# Patient Record
Sex: FEMALE | Race: WHITE | NOT HISPANIC OR LATINO | URBAN - METROPOLITAN AREA
[De-identification: names, ages, dates, MRNs, and addresses within clinical notes are randomized per-mention and may not be internally consistent; named-entity substitution may affect disease eponyms.]

---

## 2019-01-17 ENCOUNTER — OUTPATIENT (OUTPATIENT)
Dept: OUTPATIENT SERVICES | Facility: HOSPITAL | Age: 84
LOS: 1 days | Discharge: HOME | End: 2019-01-17
Payer: MEDICARE

## 2019-01-17 DIAGNOSIS — Z98.89 OTHER SPECIFIED POSTPROCEDURAL STATES: Chronic | ICD-10-CM

## 2019-01-17 DIAGNOSIS — Z90.49 ACQUIRED ABSENCE OF OTHER SPECIFIED PARTS OF DIGESTIVE TRACT: Chronic | ICD-10-CM

## 2019-01-17 PROCEDURE — C1764: CPT

## 2019-01-17 PROCEDURE — 33285 INSJ SUBQ CAR RHYTHM MNTR: CPT

## 2019-01-17 NOTE — PROGRESS NOTE ADULT - SUBJECTIVE AND OBJECTIVE BOX
Cardiac Electrophysiology Admission Note    History of Present Illness: 83    Past Medical History:    Past Surgical History:    Family History: Non-contributory  Social History: denies smoking, drugs.  Social ETOH (0-5 drinks a week)  Allergies: shellfish, eggs, soy, gluten, dairy, latex  Medications: Reglan PRN and Zyrtec PRN  Physical:   HR: 95		BP: 125/78		O2 Sat 100%  	Temp: afebrile  GEN: NAD  HEENT: no JVD  CARDS: S1S2 RRR  LUNGS: CTAB no w/r/r  EXT: no edema  NEURO: no deficit noted    EKG: Normal sinus rhythm.    A/P: Cardiac Electrophysiology Admission Note    History of Present Illness: 83 y.o.    Past Medical History:    Past Surgical History:    Family History: Non-contributory  Social History: denies smoking, drugs.  Social ETOH (0-5 drinks a week)  Allergies: shellfish, eggs, soy, gluten, dairy, latex  Medications: Reglan PRN and Zyrtec PRN  Physical:   HR: 95		BP: 125/78		O2 Sat 100%  	Temp: afebrile  GEN: NAD  HEENT: no JVD  CARDS: S1S2 RRR  LUNGS: CTAB no w/r/r  EXT: no edema  NEURO: no deficit noted    EKG: Normal sinus rhythm.    A/P: Cardiac Electrophysiology Admission Note    History of Present Illness: 83 y.o. with pmhx significant for Afib, on Eliquis, polymyalgia rheumatica, L BCA, and SCC of tongue, who presents for an ILR implant. Pt feels well. Denies palpitations, lightheadedness, c/p, sob and recent syncope.       Past Medical History:    see above    Past Surgical History:    see above    Family History: Non-contributory    Social History: Non-smoker    Allergies: PCN    Medications:  Eliquis 5 mg BID  Toprol 50 mg QD  Asa 81 mg QD  Crestor 10 mg QD  Prednisone 5 mg QD    Physical:   HR: 95		BP: 125/78		O2 Sat 100%  	Temp: afebrile    GEN: NAD  HEENT: no JVD  CARDS: S1S2 RRR  LUNGS: CTAB no w/r/r  EXT: no edema  NEURO: no deficit noted    EKG: Normal sinus rhythm.    A/P:    - Plan for ILR implant. Procedure explained, along with risks, including infection and bleeding. Pt amenable. Consent signed.   - D/c immediately after implant.   - F/u with Dr. Haynes in one month.

## 2019-01-27 DIAGNOSIS — I48.91 UNSPECIFIED ATRIAL FIBRILLATION: ICD-10-CM

## 2019-01-27 DIAGNOSIS — Z88.0 ALLERGY STATUS TO PENICILLIN: ICD-10-CM

## 2019-01-27 DIAGNOSIS — Z79.82 LONG TERM (CURRENT) USE OF ASPIRIN: ICD-10-CM

## 2019-01-27 DIAGNOSIS — R55 SYNCOPE AND COLLAPSE: ICD-10-CM

## 2019-01-27 DIAGNOSIS — M35.3 POLYMYALGIA RHEUMATICA: ICD-10-CM

## 2019-01-27 DIAGNOSIS — Z79.01 LONG TERM (CURRENT) USE OF ANTICOAGULANTS: ICD-10-CM

## 2019-01-27 DIAGNOSIS — Z79.52 LONG TERM (CURRENT) USE OF SYSTEMIC STEROIDS: ICD-10-CM

## 2022-01-27 ENCOUNTER — OUTPATIENT (OUTPATIENT)
Dept: OUTPATIENT SERVICES | Facility: HOSPITAL | Age: 87
LOS: 1 days | Discharge: HOME | End: 2022-01-27
Payer: MEDICARE

## 2022-01-27 DIAGNOSIS — Z98.89 OTHER SPECIFIED POSTPROCEDURAL STATES: Chronic | ICD-10-CM

## 2022-01-27 DIAGNOSIS — Z90.49 ACQUIRED ABSENCE OF OTHER SPECIFIED PARTS OF DIGESTIVE TRACT: Chronic | ICD-10-CM

## 2022-01-27 PROCEDURE — 84132 ASSAY OF SERUM POTASSIUM: CPT

## 2022-01-27 PROCEDURE — 85610 PROTHROMBIN TIME: CPT

## 2022-01-27 PROCEDURE — 82565 ASSAY OF CREATININE: CPT

## 2022-01-27 NOTE — PROGRESS NOTE ADULT - SUBJECTIVE AND OBJECTIVE BOX
Cardiac Electrophysiology Admission Note    History of Present Illness:  86 year old lewis family with HLD, TIA, PMR and atrial fibrillation, who presents for initial evaluation.    She states she has been on a NOAC for the past 4 years - she notes compliance with no missed doses.  She states for the past 6 and a half months she has had increased SOB after about 1/2 a block.  She has palpitations as well.  She has had 2 syncopal episodes over the past year which she has been worked up at [facility] and has followed up with her cardiologist - she is unaware of a diagnosis.  No chest pain, SOB at rest, orthopnea, PND or edema.  She presents for an elective cardioversion.     Past Medical History:  as above  Past Surgical History:  carpel tunnel, breast lumpectomy  Family History: Non-contributory  Social History: denies smoking, drugs, ETOH  Allergies: PCN  Medications:    Physical:   HR: 105	BP: 125/80		O2 Sat 100%  	Temp: afebrile  GEN: NAD  HEENT: no JVD  CARDS: regularly irregular  LUNGS: CTAB no w/r/r  EXT: no edema  NEURO: no deficit noted    EKG:AT    A/P:  86 year old lewis family with HLD, TIA, PMR and atrial fibrillation, who presents for initial evaluation.  NPO and consented for cardioversion

## 2022-01-28 LAB
ISTAT INR: 1.8 — HIGH (ref 0.88–1.16)
ISTAT PT: 21.2 SEC — HIGH (ref 10–12.9)
ISTAT VENOUS BE: 3 MMOL/L — SIGNIFICANT CHANGE UP (ref -2–3)
ISTAT VENOUS GLUCOSE: 88 MG/DL — SIGNIFICANT CHANGE UP (ref 70–99)
ISTAT VENOUS HCO3: 28 MMOL/L — SIGNIFICANT CHANGE UP (ref 23–28)
ISTAT VENOUS HEMATOCRIT: 33 % — LOW (ref 34.5–45)
ISTAT VENOUS HEMOGLOBIN: 11.2 GM/DL — LOW (ref 11.5–15.5)
ISTAT VENOUS IONIZED CALCIUM: 1.23 MMOL/L — SIGNIFICANT CHANGE UP (ref 1.12–1.3)
ISTAT VENOUS PCO2: 45 MMHG — SIGNIFICANT CHANGE UP (ref 41–51)
ISTAT VENOUS PH: 7.41 — SIGNIFICANT CHANGE UP (ref 7.31–7.41)
ISTAT VENOUS PO2: <66 MMHG — LOW (ref 35–40)
ISTAT VENOUS POTASSIUM: 3.4 MMOL/L — LOW (ref 3.5–5.3)
ISTAT VENOUS SODIUM: 142 MMOL/L — SIGNIFICANT CHANGE UP (ref 135–145)
ISTAT VENOUS TCO2: 30 MMOL/L — SIGNIFICANT CHANGE UP (ref 22–31)
POCT ISTAT CREATININE: 1.3 MG/DL — SIGNIFICANT CHANGE UP (ref 0.5–1.3)

## 2022-03-10 DIAGNOSIS — I48.0 PAROXYSMAL ATRIAL FIBRILLATION: ICD-10-CM

## 2024-04-27 ENCOUNTER — NON-APPOINTMENT (OUTPATIENT)
Age: 89
End: 2024-04-27

## 2025-01-16 ENCOUNTER — INPATIENT (INPATIENT)
Facility: HOSPITAL | Age: 89
LOS: 4 days | Discharge: HOME HEALTH | End: 2025-01-21
Attending: SPECIALIST | Admitting: SPECIALIST
Payer: MEDICARE

## 2025-01-16 VITALS
OXYGEN SATURATION: 99 % | WEIGHT: 108.91 LBS | SYSTOLIC BLOOD PRESSURE: 118 MMHG | RESPIRATION RATE: 18 BRPM | DIASTOLIC BLOOD PRESSURE: 75 MMHG | HEART RATE: 105 BPM | TEMPERATURE: 98 F
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
## 2025-01-16 DIAGNOSIS — E03.9 HYPOTHYROIDISM, UNSPECIFIED: ICD-10-CM

## 2025-01-16 DIAGNOSIS — Z85.3 PERSONAL HISTORY OF MALIGNANT NEOPLASM OF BREAST: ICD-10-CM

## 2025-01-16 DIAGNOSIS — I10 ESSENTIAL (PRIMARY) HYPERTENSION: ICD-10-CM

## 2025-01-16 DIAGNOSIS — I45.10 UNSPECIFIED RIGHT BUNDLE-BRANCH BLOCK: ICD-10-CM

## 2025-01-16 DIAGNOSIS — A41.89 OTHER SPECIFIED SEPSIS: ICD-10-CM

## 2025-01-16 DIAGNOSIS — Z88.0 ALLERGY STATUS TO PENICILLIN: ICD-10-CM

## 2025-01-16 DIAGNOSIS — N28.9 DISORDER OF KIDNEY AND URETER, UNSPECIFIED: ICD-10-CM

## 2025-01-16 DIAGNOSIS — I95.9 HYPOTENSION, UNSPECIFIED: ICD-10-CM

## 2025-01-16 DIAGNOSIS — M35.3 POLYMYALGIA RHEUMATICA: ICD-10-CM

## 2025-01-16 DIAGNOSIS — I24.89 OTHER FORMS OF ACUTE ISCHEMIC HEART DISEASE: ICD-10-CM

## 2025-01-16 DIAGNOSIS — D64.9 ANEMIA, UNSPECIFIED: ICD-10-CM

## 2025-01-16 DIAGNOSIS — I50.32 CHRONIC DIASTOLIC (CONGESTIVE) HEART FAILURE: ICD-10-CM

## 2025-01-16 DIAGNOSIS — E86.0 DEHYDRATION: ICD-10-CM

## 2025-01-16 DIAGNOSIS — Z90.49 ACQUIRED ABSENCE OF OTHER SPECIFIED PARTS OF DIGESTIVE TRACT: Chronic | ICD-10-CM

## 2025-01-16 DIAGNOSIS — I48.20 CHRONIC ATRIAL FIBRILLATION, UNSPECIFIED: ICD-10-CM

## 2025-01-16 DIAGNOSIS — C50.919 MALIGNANT NEOPLASM OF UNSPECIFIED SITE OF UNSPECIFIED FEMALE BREAST: ICD-10-CM

## 2025-01-16 DIAGNOSIS — U07.1 COVID-19: ICD-10-CM

## 2025-01-16 DIAGNOSIS — Z79.01 LONG TERM (CURRENT) USE OF ANTICOAGULANTS: ICD-10-CM

## 2025-01-16 DIAGNOSIS — Z79.890 HORMONE REPLACEMENT THERAPY: ICD-10-CM
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
## 2025-01-16 DIAGNOSIS — Z79.82 LONG TERM (CURRENT) USE OF ASPIRIN: ICD-10-CM

## 2025-01-16 DIAGNOSIS — S00.31XA ABRASION OF NOSE, INITIAL ENCOUNTER: ICD-10-CM

## 2025-01-16 DIAGNOSIS — J12.82 PNEUMONIA DUE TO CORONAVIRUS DISEASE 2019: ICD-10-CM

## 2025-01-16 DIAGNOSIS — Z86.73 PERSONAL HISTORY OF TRANSIENT ISCHEMIC ATTACK (TIA), AND CEREBRAL INFARCTION WITHOUT RESIDUAL DEFICITS: ICD-10-CM

## 2025-01-16 DIAGNOSIS — I11.0 HYPERTENSIVE HEART DISEASE WITH HEART FAILURE: ICD-10-CM

## 2025-01-16 DIAGNOSIS — Z85.810 PERSONAL HISTORY OF MALIGNANT NEOPLASM OF TONGUE: ICD-10-CM

## 2025-01-16 DIAGNOSIS — R79.89 OTHER SPECIFIED ABNORMAL FINDINGS OF BLOOD CHEMISTRY: ICD-10-CM

## 2025-01-16 DIAGNOSIS — Z98.89 OTHER SPECIFIED POSTPROCEDURAL STATES: Chronic | ICD-10-CM
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
## 2025-01-16 DIAGNOSIS — Z29.9 ENCOUNTER FOR PROPHYLACTIC MEASURES, UNSPECIFIED: ICD-10-CM

## 2025-01-16 LAB
ADD ON TEST-SPECIMEN IN LAB: SIGNIFICANT CHANGE UP
ALBUMIN SERPL ELPH-MCNC: 3.9 G/DL — SIGNIFICANT CHANGE UP (ref 3.3–5)
ALP SERPL-CCNC: 50 U/L — SIGNIFICANT CHANGE UP (ref 40–120)
ALT FLD-CCNC: 13 U/L — SIGNIFICANT CHANGE UP (ref 10–45)
ANION GAP SERPL CALC-SCNC: 17 MMOL/L — SIGNIFICANT CHANGE UP (ref 5–17)
APPEARANCE UR: CLEAR — SIGNIFICANT CHANGE UP
APTT BLD: 33.9 SEC — SIGNIFICANT CHANGE UP (ref 24.5–35.6)
AST SERPL-CCNC: 33 U/L — SIGNIFICANT CHANGE UP (ref 10–40)
BASOPHILS # BLD AUTO: 0.04 K/UL — SIGNIFICANT CHANGE UP (ref 0–0.2)
BASOPHILS NFR BLD AUTO: 0.3 % — SIGNIFICANT CHANGE UP (ref 0–2)
BILIRUB SERPL-MCNC: 0.5 MG/DL — SIGNIFICANT CHANGE UP (ref 0.2–1.2)
BILIRUB UR-MCNC: NEGATIVE — SIGNIFICANT CHANGE UP
BUN SERPL-MCNC: 22 MG/DL — SIGNIFICANT CHANGE UP (ref 7–23)
CALCIUM SERPL-MCNC: 9.2 MG/DL — SIGNIFICANT CHANGE UP (ref 8.4–10.5)
CHLORIDE SERPL-SCNC: 96 MMOL/L — SIGNIFICANT CHANGE UP (ref 96–108)
CO2 SERPL-SCNC: 25 MMOL/L — SIGNIFICANT CHANGE UP (ref 22–31)
COLOR SPEC: YELLOW — SIGNIFICANT CHANGE UP
CREAT SERPL-MCNC: 1.25 MG/DL — SIGNIFICANT CHANGE UP (ref 0.5–1.3)
DIFF PNL FLD: ABNORMAL
EGFR: 41 ML/MIN/1.73M2 — LOW
EOSINOPHIL # BLD AUTO: 0.01 K/UL — SIGNIFICANT CHANGE UP (ref 0–0.5)
EOSINOPHIL NFR BLD AUTO: 0.1 % — SIGNIFICANT CHANGE UP (ref 0–6)
FLUAV AG NPH QL: SIGNIFICANT CHANGE UP
FLUBV AG NPH QL: SIGNIFICANT CHANGE UP
GAS PNL BLDV: SIGNIFICANT CHANGE UP
GLUCOSE SERPL-MCNC: 133 MG/DL — HIGH (ref 70–99)
GLUCOSE UR QL: NEGATIVE MG/DL — SIGNIFICANT CHANGE UP
HCT VFR BLD CALC: 30.9 % — LOW (ref 34.5–45)
HGB BLD-MCNC: 10.1 G/DL — LOW (ref 11.5–15.5)
IMM GRANULOCYTES NFR BLD AUTO: 0.8 % — SIGNIFICANT CHANGE UP (ref 0–0.9)
INR BLD: 1.27 — HIGH (ref 0.85–1.16)
KETONES UR-MCNC: NEGATIVE MG/DL — SIGNIFICANT CHANGE UP
LACTATE SERPL-SCNC: 1.8 MMOL/L — SIGNIFICANT CHANGE UP (ref 0.5–2)
LACTATE SERPL-SCNC: 2.1 MMOL/L — HIGH (ref 0.5–2)
LEUKOCYTE ESTERASE UR-ACNC: NEGATIVE — SIGNIFICANT CHANGE UP
LYMPHOCYTES # BLD AUTO: 1.18 K/UL — SIGNIFICANT CHANGE UP (ref 1–3.3)
LYMPHOCYTES # BLD AUTO: 10.2 % — LOW (ref 13–44)
MCHC RBC-ENTMCNC: 29.9 PG — SIGNIFICANT CHANGE UP (ref 27–34)
MCHC RBC-ENTMCNC: 32.7 G/DL — SIGNIFICANT CHANGE UP (ref 32–36)
MCV RBC AUTO: 91.4 FL — SIGNIFICANT CHANGE UP (ref 80–100)
MONOCYTES # BLD AUTO: 1.28 K/UL — HIGH (ref 0–0.9)
MONOCYTES NFR BLD AUTO: 11.1 % — SIGNIFICANT CHANGE UP (ref 2–14)
NEUTROPHILS # BLD AUTO: 8.96 K/UL — HIGH (ref 1.8–7.4)
NEUTROPHILS NFR BLD AUTO: 77.5 % — HIGH (ref 43–77)
NITRITE UR-MCNC: NEGATIVE — SIGNIFICANT CHANGE UP
NRBC # BLD: 0 /100 WBCS — SIGNIFICANT CHANGE UP (ref 0–0)
NRBC BLD-RTO: 0 /100 WBCS — SIGNIFICANT CHANGE UP (ref 0–0)
PH UR: 5.5 — SIGNIFICANT CHANGE UP (ref 5–8)
PLATELET # BLD AUTO: 191 K/UL — SIGNIFICANT CHANGE UP (ref 150–400)
POTASSIUM SERPL-MCNC: 4.1 MMOL/L — SIGNIFICANT CHANGE UP (ref 3.5–5.3)
POTASSIUM SERPL-SCNC: 4.1 MMOL/L — SIGNIFICANT CHANGE UP (ref 3.5–5.3)
PROT SERPL-MCNC: 6.9 G/DL — SIGNIFICANT CHANGE UP (ref 6–8.3)
PROT UR-MCNC: 30 MG/DL
PROTHROM AB SERPL-ACNC: 14.6 SEC — HIGH (ref 9.9–13.4)
RBC # BLD: 3.38 M/UL — LOW (ref 3.8–5.2)
RBC # FLD: 14.6 % — HIGH (ref 10.3–14.5)
RSV RNA NPH QL NAA+NON-PROBE: SIGNIFICANT CHANGE UP
SARS-COV-2 RNA SPEC QL NAA+PROBE: DETECTED
SODIUM SERPL-SCNC: 138 MMOL/L — SIGNIFICANT CHANGE UP (ref 135–145)
SP GR SPEC: >1.03 — HIGH (ref 1–1.03)
TROPONIN T, HIGH SENSITIVITY RESULT: 307 NG/L — CRITICAL HIGH (ref 0–51)
TROPONIN T, HIGH SENSITIVITY RESULT: 373 NG/L — CRITICAL HIGH (ref 0–51)
UROBILINOGEN FLD QL: 0.2 MG/DL — SIGNIFICANT CHANGE UP (ref 0.2–1)
WBC # BLD: 11.56 K/UL — HIGH (ref 3.8–10.5)
WBC # FLD AUTO: 11.56 K/UL — HIGH (ref 3.8–10.5)

## 2025-01-16 PROCEDURE — 70450 CT HEAD/BRAIN W/O DYE: CPT | Mod: 26

## 2025-01-16 PROCEDURE — 71045 X-RAY EXAM CHEST 1 VIEW: CPT | Mod: 26

## 2025-01-16 PROCEDURE — 99291 CRITICAL CARE FIRST HOUR: CPT

## 2025-01-16 PROCEDURE — 93010 ELECTROCARDIOGRAM REPORT: CPT

## 2025-01-16 PROCEDURE — 71250 CT THORAX DX C-: CPT | Mod: 26

## 2025-01-16 PROCEDURE — 70486 CT MAXILLOFACIAL W/O DYE: CPT | Mod: 26

## 2025-01-16 PROCEDURE — 74177 CT ABD & PELVIS W/CONTRAST: CPT | Mod: 26

## 2025-01-16 RX ORDER — RITONAVIR 100 MG/1
100 TABLET ORAL EVERY 12 HOURS
Refills: 0 | Status: DISCONTINUED | OUTPATIENT
Start: 2025-01-16 | End: 2025-01-16

## 2025-01-16 RX ORDER — SODIUM CHLORIDE 9 G/ML
1550 INJECTION, SOLUTION INTRAVENOUS ONCE
Refills: 0 | Status: DISCONTINUED | OUTPATIENT
Start: 2025-01-16 | End: 2025-01-16

## 2025-01-16 RX ORDER — LEVOTHYROXINE SODIUM 25 UG/1
25 TABLET ORAL DAILY
Refills: 0 | Status: DISCONTINUED | OUTPATIENT
Start: 2025-01-16 | End: 2025-01-21

## 2025-01-16 RX ORDER — REMDESIVIR 100 MG/1
INJECTION, POWDER, LYOPHILIZED, FOR SOLUTION INTRAVENOUS
Refills: 0 | Status: DISCONTINUED | OUTPATIENT
Start: 2025-01-16 | End: 2025-01-16

## 2025-01-16 RX ORDER — ACETAMINOPHEN, DIPHENHYDRAMINE HCL, PHENYLEPHRINE HCL 325; 25; 5 MG/1; MG/1; MG/1
3 TABLET ORAL AT BEDTIME
Refills: 0 | Status: DISCONTINUED | OUTPATIENT
Start: 2025-01-16 | End: 2025-01-21

## 2025-01-16 RX ORDER — REMDESIVIR 100 MG/1
200 INJECTION, POWDER, LYOPHILIZED, FOR SOLUTION INTRAVENOUS EVERY 24 HOURS
Refills: 0 | Status: COMPLETED | OUTPATIENT
Start: 2025-01-16 | End: 2025-01-17

## 2025-01-16 RX ORDER — METOPROLOL SUCCINATE 25 MG
100 TABLET, EXTENDED RELEASE 24 HR ORAL EVERY 24 HOURS
Refills: 0 | Status: DISCONTINUED | OUTPATIENT
Start: 2025-01-17 | End: 2025-01-17

## 2025-01-16 RX ORDER — ACETAMINOPHEN 160 MG/5ML
650 SUSPENSION ORAL EVERY 6 HOURS
Refills: 0 | Status: DISCONTINUED | OUTPATIENT
Start: 2025-01-16 | End: 2025-01-21

## 2025-01-16 RX ORDER — ONDANSETRON 4 MG/1
4 TABLET, ORALLY DISINTEGRATING ORAL EVERY 8 HOURS
Refills: 0 | Status: DISCONTINUED | OUTPATIENT
Start: 2025-01-16 | End: 2025-01-16

## 2025-01-16 RX ORDER — PREDNISONE 5 MG/1
5 TABLET ORAL DAILY
Refills: 0 | Status: DISCONTINUED | OUTPATIENT
Start: 2025-01-16 | End: 2025-01-21

## 2025-01-16 RX ORDER — MAGNESIUM, ALUMINUM HYDROXIDE 200-225/5
30 SUSPENSION, ORAL (FINAL DOSE FORM) ORAL EVERY 4 HOURS
Refills: 0 | Status: DISCONTINUED | OUTPATIENT
Start: 2025-01-16 | End: 2025-01-21

## 2025-01-16 RX ORDER — MAGNESIUM SULFATE 0.8 MEQ/ML
2 AMPUL (ML) INJECTION ONCE
Refills: 0 | Status: COMPLETED | OUTPATIENT
Start: 2025-01-16 | End: 2025-01-16

## 2025-01-16 RX ORDER — BACTERIOSTATIC SODIUM CHLORIDE 0.9 %
1000 VIAL (ML) INJECTION ONCE
Refills: 0 | Status: COMPLETED | OUTPATIENT
Start: 2025-01-16 | End: 2025-01-16

## 2025-01-16 RX ORDER — CLOSTRIDIUM TETANI TOXOID ANTIGEN (FORMALDEHYDE INACTIVATED), CORYNEBACTERIUM DIPHTHERIAE TOXOID ANTIGEN (FORMALDEHYDE INACTIVATED), BORDETELLA PERTUSSIS TOXOID ANTIGEN (GLUTARALDEHYDE INACTIVATED), BORDETELLA PERTUSSIS FILAMENTOUS HEMAGGLUTININ ANTIGEN (FORMALDEHYDE INACTIVATED), BORDETELLA PERTUSSIS PERTACTIN ANTIGEN, AND BORDETELLA PERTUSSIS FIMBRIAE 2/3 ANTIGEN 5; 2; 2.5; 5; 3; 5 [LF]/.5ML; [LF]/.5ML; UG/.5ML; UG/.5ML; UG/.5ML; UG/.5ML
0.5 INJECTION, SUSPENSION INTRAMUSCULAR ONCE
Refills: 0 | Status: COMPLETED | OUTPATIENT
Start: 2025-01-16 | End: 2025-01-16

## 2025-01-16 RX ORDER — BACTERIOSTATIC SODIUM CHLORIDE 0.9 %
1000 VIAL (ML) INJECTION
Refills: 0 | Status: DISCONTINUED | OUTPATIENT
Start: 2025-01-16 | End: 2025-01-16

## 2025-01-16 RX ORDER — BACTERIOSTATIC SODIUM CHLORIDE 0.9 %
1550 VIAL (ML) INJECTION ONCE
Refills: 0 | Status: COMPLETED | OUTPATIENT
Start: 2025-01-16 | End: 2025-01-16

## 2025-01-16 RX ORDER — REMDESIVIR 100 MG/1
INJECTION, POWDER, LYOPHILIZED, FOR SOLUTION INTRAVENOUS
Refills: 0 | Status: COMPLETED | OUTPATIENT
Start: 2025-01-16 | End: 2025-01-21

## 2025-01-16 RX ADMIN — Medication 1000 MILLILITER(S): at 18:30

## 2025-01-16 RX ADMIN — RITONAVIR 100 MILLIGRAM(S): 100 TABLET ORAL at 18:45

## 2025-01-16 RX ADMIN — Medication 1550 MILLILITER(S): at 15:23

## 2025-01-16 RX ADMIN — CLOSTRIDIUM TETANI TOXOID ANTIGEN (FORMALDEHYDE INACTIVATED), CORYNEBACTERIUM DIPHTHERIAE TOXOID ANTIGEN (FORMALDEHYDE INACTIVATED), BORDETELLA PERTUSSIS TOXOID ANTIGEN (GLUTARALDEHYDE INACTIVATED), BORDETELLA PERTUSSIS FILAMENTOUS HEMAGGLUTININ ANTIGEN (FORMALDEHYDE INACTIVATED), BORDETELLA PERTUSSIS PERTACTIN ANTIGEN, AND BORDETELLA PERTUSSIS FIMBRIAE 2/3 ANTIGEN 0.5 MILLILITER(S): 5; 2; 2.5; 5; 3; 5 INJECTION, SUSPENSION INTRAMUSCULAR at 15:22

## 2025-01-16 NOTE — ED ADULT TRIAGE NOTE - CHIEF COMPLAINT QUOTE
Pt presents to the ED for fall with positive head strike. Pt endorses "I don't know how I fell, I just fell." Per EMS pt was found to be hypotensive, tachycardic, on arrival. Pt given Tylenol prior to arrival for fever or uknown t max. Pt given 500cc NS by EMS prior to arrival. Pt on Xarelto.

## 2025-01-16 NOTE — H&P ADULT - HISTORY OF PRESENT ILLNESS
Patient is 89 y.o PMHx of significant for Afib, on Eliquis, polymyalgia rheumatica, L BCA, and SCC of tongue, who presents for unwitnessed fall. Per ED notes: Patient presented for unwitnessed fall with head stroke , she does not know how she fell, She was found hypotensive, tachycardic, with fever received 500cc ofNS and Tylenol for fever  As per daughter, pt had been coughing x few days, was noted to be incontinent of stool and felt warm. + n/v, nbnb emesis. After IV fluids: Pt  much improved, able to recall walking to bathroom, feeling weak and falling, hitting face on ground. Denies loc.     In the ED: FAST exam performed and neg for free fluid. Pt evaluated by cards who feels elev trop is due to demand ischemia from COVID/ hypotension; no indications for cards tele.     In the ED:   VS: T 98.5, T 105->97, BP 96/52->111/55, spo2 96% in RA, rr 18  Labs: WBC 11.56, Hgb 10.1, Plt 191, Trop 373->307, Lactate 2.1->1.8, Bicarb 25, AG 17, creatinine 1.25, COVID +,   Imaging:   CT HEAD: No acute abnormality. Chronic changes as above.Extensive periventricular hypoattenuation, compatible with advanced chronic microvascular ischemic changes. There is a small chronic lacunar infarct in the left corona radiata  CT FACIAL BONE: No acute abnormality  CT chest, a/p: No acute abnormality in the chest. A tiny hypodense focus peripherally in the spleen, uncertain if this represents a small cyst or a tiny laceration. No associated fluid or fatty stranding.  No acute rib fractures. An indeterminate cystic lesion in the right kidney for which further characterization with ultrasound is advised on outpatient basis.    ECG: HR 97, QTc 515, RBBB, (not seen in ECG from 2022) no significant ST wave changes  Intervnetion: Tdap, levofloxasin, Paxlovid, NS 2.5 L   Patient is 89 y.o PMHx of significant for Xarelto, on Eliquis, polymyalgia rheumatica, L BCA, and SCC of tongue (no hx of chemo), who presents for unwitnessed fall.  Patient presented for unwitnessed fall with head strike. Per daughter at bedside, mom was last seen by friends last night in usual state of health. This morning daughter called patient not answering. Other daughter then went home found patient in bed, appeared drowsy, was noted to be incontinent of stool  and called EMS. Upon arrival of EMS: She was found hypotensive, tachycardic, with fever received 500cc of NS and Tylenol for fever. As per daughter, pt had been coughing x few days. Upon arrivial to ED: after IV fluidspt much improved, able to recall walking to bathroom, feeling weak and falling, hitting face on ground. Denies loc.   In the ED: FAST exam performed and neg for free fluid. Pt evaluated by cards who feels elev trop, thought to be due to demand ischemia from COVID/ hypotension; no indications for cards tele.     In the ED:   VS: T 98.5, T 105->97, BP 96/52->111/55, spo2 96% in RA, rr 18  Labs: WBC 11.56, Hgb 10.1, Plt 191, Trop 373->307, Lactate 2.1->1.8, Bicarb 25, AG 17, creatinine 1.25, COVID +,   Imaging:   CT HEAD: No acute abnormality. Chronic changes as above. Extensive periventricular hypoattenuation, compatible with advanced chronic microvascular ischemic changes. There is a small chronic lacunar infarct in the left corona radiata  CT FACIAL BONE: No acute abnormality  CT chest, a/p: No acute abnormality in the chest. A tiny hypodense focus peripherally in the spleen, uncertain if this represents a small cyst or a tiny laceration. No associated fluid or fatty stranding.  No acute rib fractures. An indeterminate cystic lesion in the right kidney for which further characterization with ultrasound is advised on outpatient basis.    ECG: HR 97, QTc 515, RBBB, (not seen in ECG from 2022) no significant ST wave changes  Intervnetion: Tdap, levofloxasin, Paxlovid, NS 2.5 L   Patient is 89 y.o PMHx of significant for Afib on Xarelto, polymyalgia rheumatica, L BCA, and SCC of tongue (no hx of chemo), who presents for unwitnessed fall.  Patient presented for unwitnessed fall with head strike. Per daughter at bedside, mom was last seen by friends last night in usual state of health. This morning daughter called patient not answering. Other daughter then went home found patient in bed, appeared drowsy, was noted to be incontinent of stool  and called EMS. Upon arrival of EMS: She was found hypotensive, tachycardic, with fever received 500cc of NS and Tylenol for fever. As per daughter, pt had been coughing x few days. Upon arrivial to ED: after IV fluids pt much improved, able to recall walking to bathroom, feeling weak and falling, hitting face on ground. Denies loc.   In the ED: FAST exam performed and neg for free fluid. Pt evaluated by cards given elevated trop: thought to be due to demand ischemia from COVID/ hypotension; no indications for cards tele.     In the ED:   VS: T 98.5, T 105->97, BP 96/52->111/55, spo2 96% in RA, rr 18  Labs: WBC 11.56, Hgb 10.1, Plt 191, Trop 373->307, Lactate 2.1->1.8, Bicarb 25, AG 17, creatinine 1.25, COVID +,   Imaging:   CT HEAD: No acute abnormality. Chronic changes as above. Extensive periventricular hypoattenuation, compatible with advanced chronic microvascular ischemic changes. There is a small chronic lacunar infarct in the left corona radiata  CT FACIAL BONE: No acute abnormality  CT chest, a/p: No acute abnormality in the chest. A tiny hypodense focus peripherally in the spleen, uncertain if this represents a small cyst or a tiny laceration. No associated fluid or fatty stranding.  No acute rib fractures. An indeterminate cystic lesion in the right kidney for which further characterization with ultrasound is advised on outpatient basis.    ECG: HR 97, QTc 515, RBBB, (not seen in ECG from 2022) no significant ST wave changes  Intervnetion: Tdap, levofloxasin, Paxlovid, NS 2.5 L

## 2025-01-16 NOTE — H&P ADULT - PROBLEM SELECTOR PLAN 6
Home med per HIE: Metoprolol  qd and xarelto 15 qd    Plan:   -start metorpolol  qd  -Hold Xarelto for tonight (?spleen hematoma), if stable HGB, VS may consider restarting tmrw   -Med rec in AM

## 2025-01-16 NOTE — H&P ADULT - PROBLEM SELECTOR PLAN 3
Trop 373->307  ECG: HR 97, QTc 515, RBBB, (not seen in ECG from 2022) no significant ST wave changes    Plan:   -f/u cards recs (likely demand ischemia, no need for tele)  -repeat ECG in AM  -Avoid QT-c prolonging meds  -Maintain Mg>2, Ph>3

## 2025-01-16 NOTE — H&P ADULT - ASSESSMENT
Patient is 89 y.o PMHx of significant for Afib, on Eliquis, polymyalgia rheumatica, L BCA, and SCC of tongue, who presents for unwitnessed fall found to have COVID PNA, admitted for further work up of fall and treatment of COVID  Patient is 89 y.o PMHx of significant for Xarelto, on Eliquis, polymyalgia rheumatica, L BCA, and SCC of tongue (no hx of chemo), who presents for unwitnessed fall found to have hypotension initially, with COVID PNA, admitted for further work up of fall and treatment of COVID

## 2025-01-16 NOTE — H&P ADULT - PROBLEM SELECTOR PLAN 10
An indeterminate cystic lesion in the right kidney for which further characterization with ultrasound is advised on outpatient basis.    Plan:   -outpatinet f/u

## 2025-01-16 NOTE — H&P ADULT - PROBLEM SELECTOR PLAN 2
On admisison, Hgb 10.1. No recent baseline hgb 2016 8.2->8.8 COVID +  Reports cough for few days   CXR with vascular congestion otherwise clear lungs    Plan:   -c/w remdesivier for 5 days   -Monitor off abx  -f/u blood culture  -Collect sputum cltx, MRSA, urine strep/legionella (in case need for abx if signs of worsening infection/respiratory status worsen) COVID +  Reports cough for few days   CXR with vascular congestion otherwise clear lungs    Plan:   -c/w remdesivier for 5 days   -f/u blood culture  -Collect sputum cltx, MRSA, urine strep/legionella (in case need for abx if signs of worsening infection/respiratory status worsen)

## 2025-01-16 NOTE — ED PROVIDER NOTE - OBJECTIVE STATEMENT
Pt is an 86yo f, h/o SCC of tongue, breast ca, HTN, afib on xarelto, biba s/p fall. Pt does not recall what happened. As per daughter, pt had been coughing x few days, was noted to be incontinent of stool and felt warm. + n/v, nbnb emesis. Per EMS, pt was hypotensive and tachycardic, given NS 500cc bolus and tylenol pta. + mild left lower rib cage pain. No upper chest pain, palp, dizziness, sob, ha, neck/ back pain, n/t/w, acute vision change...+ abrasion to nose. Last td unknown.

## 2025-01-16 NOTE — H&P ADULT - PROBLEM SELECTOR PLAN 1
A tiny hypodense focus peripherally in the spleen, uncertain if this represents a small cyst or a tiny laceration. Presents with unwitnessed fall with head strike, NO LOC. Able to recall walking to bathroom feeling weak and falling. Able to walk back to bed by herself. Found with stool incontinence  No prior falls, no hx of seizure  On physical exam without focal deficit  CT head, maxillfacial, CT chest without acute findings. A tiny hypodense focus peripherally in the spleen, uncertain if this represents a small cyst or a tiny laceration.  ECG: HR 97, QTc 515, RBBB, (not seen in ECG from 2022) no significant ST wave changes  DDX: Likely orthostatic given hypotension vs low concern for seizure (as patient able to recall events) vs cardiogenic (loop recorder currently not working and no recent TTE available) vs vasovagal    Plan:   -check orthostatics in AM s/p fluids  -maintenance fluids overnight   -If change in MS, may consider vEEG  -Consider EP consult in AM to replace loop recorder  -TTE (f/u pro-BNP)  -PT consult  -fall precautions  -Monitor Hgb and VS closely given spleen finding ?cannot exclude hematoma

## 2025-01-16 NOTE — H&P ADULT - NSHPPHYSICALEXAM_GEN_ALL_CORE
.  VITAL SIGNS:  T(C): 37.5 (01-16-25 @ 19:28), Max: 37.5 (01-16-25 @ 19:28)  T(F): 99.5 (01-16-25 @ 19:28), Max: 99.5 (01-16-25 @ 19:28)  HR: 97 (01-16-25 @ 20:08) (94 - 109)  BP: 115/55 (01-16-25 @ 20:08) (96/52 - 118/75)  BP(mean): --  RR: 18 (01-16-25 @ 20:08) (18 - 18)  SpO2: 97% (01-16-25 @ 20:08) (95% - 99%)  Wt(kg): --    PHYSICAL EXAM:    Constitutional: Resting comfortably in bed; NAD  Head: NC/AT  Eyes: PERRL, EOMI, clear conjunctiva  ENT: no nasal discharge; uvula midline, no oropharyngeal erythema or exudates; MMM  Neck: supple; no JVD or thyromegaly  Respiratory: CTA B/L; no W/R/R, no retractions  Cardiac: +S1/S2; RRR; no M/R/G;  Gastrointestinal: soft, NT/ND; no rebound or guarding; +BSx4  Extremities: WWP, no clubbing or cyanosis; no peripheral edema  Musculoskeletal: NROM x4; no joint swelling, tenderness or erythema  Vascular: 2+ radial, femoral, DP/PT pulses B/L  Dermatologic: skin warm, dry and intact; no rashes, wounds, or scars  Neurologic: AAOx3; CNII-XII grossly intact; no focal deficits  Psychiatric: affect and characteristics of appearance, verbalizations, behaviors are appropriate .  VITAL SIGNS:  T(C): 37.5 (01-16-25 @ 19:28), Max: 37.5 (01-16-25 @ 19:28)  T(F): 99.5 (01-16-25 @ 19:28), Max: 99.5 (01-16-25 @ 19:28)  HR: 97 (01-16-25 @ 20:08) (94 - 109)  BP: 115/55 (01-16-25 @ 20:08) (96/52 - 118/75)  BP(mean): --  RR: 18 (01-16-25 @ 20:08) (18 - 18)  SpO2: 97% (01-16-25 @ 20:08) (95% - 99%)  Wt(kg): --    PHYSICAL EXAM:    Constitutional: Resting comfortably in bed; NAD  Head: NC/AT  Eyes: PERRL, EOMI, clear conjunctiva  ENT: no nasal discharge;; MMM  Neck: supple;   Respiratory: CTA B/L; no W/R/R, no retractions  Cardiac: +S1/S2; RRR; no M/R/G;  Gastrointestinal: soft, NT/ND; no rebound or guarding; +BSx4  Extremities: WWP, no clubbing or cyanosis; no peripheral edema  Musculoskeletal:; no joint swelling, tenderness or erythema  Vascular: 2+ radial, femoral, DP/PT pulses B/L  Dermatologic: skin warm, dry and intact; no rashes, wounds, or scars  Neurologic: AAOx2 (person and place, not time); strength 4/5 in UE and LE clau  Psychiatric: affect and characteristics of appearance, verbalizations, behaviors are appropriate

## 2025-01-16 NOTE — ED ADULT NURSE NOTE - OBJECTIVE STATEMENT
Pt is an 88 yo F bibems from home for fall. Pt unsure how she fell but thinks she was feeling lightheaded prior. Denies cp, sob, unilateral weakness, vision changes, speech changes.  Pt upgraded to MD Bunn. Pt hypotensive 90s/60s, given ~500cc NS by EMS. Pt in NAD, respirations even and unlabored. Speaking in full and complete sentences, a&ox3. Moving all extremities. 20g PIV to R forearm placed by EMS.

## 2025-01-16 NOTE — ED PROVIDER NOTE - WR ORDER NAME 1
Xray Chest 1 View-PORTABLE IMMEDIATE Closure 2 Information: This tab is for additional flaps and grafts, including complex repair and grafts and complex repair and flaps. You can also specify a different location for the additional defect, if the location is the same you do not need to select a new one. We will insert the automated text for the repair you select below just as we do for solitary flaps and grafts. Please note that at this time if you select a location with a different insurance zone you will need to override the ICD10 and CPT if appropriate.

## 2025-01-16 NOTE — H&P ADULT - NSICDXPASTMEDICALHX_GEN_ALL_CORE_FT
PAST MEDICAL HISTORY:  Carpal tunnel syndrome, unspecified laterality     Chronic atrial fibrillation     Essential hypertension     Hypothyroidism     Polymyalgia rheumatica     Squamous cell carcinoma of tongue right sided

## 2025-01-16 NOTE — H&P ADULT - NSICDXFAMILYHX_GEN_ALL_CORE_FT
FAMILY HISTORY:  Father  Still living? Unknown  Family history of essential hypertension, Age at diagnosis: Age Unknown    Mother  Still living? Unknown  Family history of essential hypertension, Age at diagnosis: Age Unknown

## 2025-01-16 NOTE — ED ADULT NURSE NOTE - NSFALLHARMRISKINTERV_ED_ALL_ED
Assistance OOB with selected safe patient handling equipment if applicable/Communicate risk of Fall with Harm to all staff, patient, and family/Provide visual cue: red socks, yellow wristband, yellow gown, etc/Reinforce activity limits and safety measures with patient and family/Bed in lowest position, wheels locked, appropriate side rails in place/Call bell, personal items and telephone in reach/Instruct patient to call for assistance before getting out of bed/chair/stretcher/Non-slip footwear applied when patient is off stretcher/[place] to call system/Physically safe environment - no spills, clutter or unnecessary equipment/Purposeful Proactive Rounding/Room/bathroom lighting operational, light cord in reach

## 2025-01-16 NOTE — ED PROVIDER NOTE - PROGRESS NOTE DETAILS
vs improved. pt stable. ct imaging neg for acute pathology. a/p w tiny focus in spleen. reviewed w Dr. Hernandez, unlikely traumatic/ bleed. trop and lactate downtrending. discussed w daughter/ pt. admit to floor. - Dr. Freeman

## 2025-01-16 NOTE — ED PROVIDER NOTE - CLINICAL SUMMARY MEDICAL DECISION MAKING FREE TEXT BOX
Impression: Pt is an 86yo f, h/o SCC of tongue, breast ca, HTN, afib on xarelto, biba s/p fall. Pt does not recall what happened. As per daughter, pt had been coughing x few days, was noted to be incontinent of stool and felt warm. + n/v, nbnb emesis. Per EMS, pt was hypotensive and tachycardic, given NS 500cc bolus and tylenol pta. + mild left lower rib cage pain. No upper chest pain, palp, dizziness, sob, ha, neck/ back pain, n/t/w, acute vision change...+ abrasion to nose. Last td unknown. Impression: Pt is an 84yo f, h/o SCC of tongue, breast ca, HTN, afib on xarelto, biba s/p fall. Pt does not recall what happened. As per daughter, pt had been coughing x few days, was noted to be incontinent of stool and felt warm. + n/v, nbnb emesis. Per EMS, pt was hypotensive and tachycardic, given NS 500cc bolus and tylenol pta. + mild left lower rib cage pain. No upper chest pain, palp, dizziness, sob, ha, neck/ back pain, n/t/w, acute vision change...+ abrasion to nose. Last td unknown.    Afebrile. BP stable. Slightly tachycardic to 105 bpm. VS otherwise stable. Pt is well appearing, cannot recall falling. Neuro exam non-focal. No c-t-l spine tenderness. Abd exam benign. ? vasovagal vs. orthostatic syncope as pt was incontinent of urine/ stool; sz also within differential. CXR neg for i/e, + pulm vasc congestion. EKG showing nsr w/ incomplete rbbb, no stemi. Pt tested pos for COVID. + leukocytosis to 11.56, with slight lactate at 2.1. Renal function and electrolytes wnl. Trop elev at 373. Clinically pt appears very dehydrated and not volume overloaded. No c/o sob w/ no resp distress. Pt given wt based ivf bolus. BP noted to be soft. FAST exam performed and neg for free fluid. Pt initially ordered for ct head, mfb, and chest to r/o acute bleed/ . fx. CT a/p also ordered to r/o intra-abd injury as pt is on xarelto. Levaquin/ paxlovid ordered (no medication interactions as per pharmacy). Pt evaluated by cards who feels elev trop is due to demand ischemia from COVID/ hypotension; no indications for cards tele. Pt is much improved w/ ivf, able to recall walking to bathroom, feeling weak and falling, hitting face on ground. Denies loc. Case d/w Dr. Hernandez. Will order additional ivf. Pt s/o to Dr. Freeman, pending repeat trop/ lactate, and CT c/a/p results.

## 2025-01-16 NOTE — H&P ADULT - NSHPLABSRESULTS_GEN_ALL_CORE
.  LABS:                         10.1   11.56 )-----------( 191      ( 16 Jan 2025 15:17 )             30.9     01-16    138  |  96  |  22  ----------------------------<  133[H]  4.1   |  25  |  1.25    Ca    9.2      16 Jan 2025 15:17    TPro  6.9  /  Alb  3.9  /  TBili  0.5  /  DBili  x   /  AST  33  /  ALT  13  /  AlkPhos  50  01-16    PT/INR - ( 16 Jan 2025 15:17 )   PT: 14.6 sec;   INR: 1.27          PTT - ( 16 Jan 2025 15:17 )  PTT:33.9 sec  Urinalysis Basic - ( 16 Jan 2025 15:17 )    Color: x / Appearance: x / SG: x / pH: x  Gluc: 133 mg/dL / Ketone: x  / Bili: x / Urobili: x   Blood: x / Protein: x / Nitrite: x   Leuk Esterase: x / RBC: x / WBC x   Sq Epi: x / Non Sq Epi: x / Bacteria: x            Lactate, Blood: 1.8 mmol/L (01-16 @ 17:20)  Lactate, Blood: 2.1 mmol/L (01-16 @ 15:17)      RADIOLOGY, EKG & ADDITIONAL TESTS: Reviewed.

## 2025-01-16 NOTE — H&P ADULT - PROBLEM SELECTOR PLAN 8
An indeterminate cystic lesion in the right kidney for which further   characterization with ultrasound is advised on outpatient basis. home med: prednisone 5 qd    Plan:   -c/w prednisone 5 qd

## 2025-01-16 NOTE — H&P ADULT - PROBLEM SELECTOR PLAN 11
DVT ppx: hold  Diet: pend bedside dysphagia will start dash diet  Replete: Mg<2, Ph<3, K<4  Dispo: RMF

## 2025-01-16 NOTE — ED PROVIDER NOTE - PHYSICAL EXAMINATION
VITAL SIGNS: I have reviewed nursing notes and confirm.  CONSTITUTIONAL: Well appearing, in no acute distress.   SKIN:  warm and dry, no acute rash.   HEAD:  normocephalic, atraumatic.  EYES: PERRLA, EOM intact; conjunctiva and sclera clear.  ENT: + dried blood to b/l nares, no active bleeding. + abrasion to bridge of nose w/ swelling. airway clear.   NECK: Supple. No midline tenderness.   CARD: S1, S2, Regular rate and rhythm.   RESP:  Clear to auscultation b/l, no wheezes, rales or rhonchi.  ABD: Normal bowel sounds; soft; non-distended; non-tender; no guarding/ rebound.  CHESTWALL: No ecchymosis, abrasions, tenderness, crepitus.   BACK: No t-l; spine tenderness. No cvat.   EXT: Normal ROM. No peripheral edema. Pulses intact and equal b/l.  NEURO: Alert, oriented x 3, at baseline mental status, CN II-XII grossly intact. Motor/ sensation intact and equal b/l. Neg pronator drift.

## 2025-01-16 NOTE — PROGRESS NOTE ADULT - SUBJECTIVE AND OBJECTIVE BOX
89 years old female h/o afib h/o L CVA hypothyroidism,HTN, polymyalgia  rheumatica,  DJD for stress test next week in preparation for joint replacement at [facility] (Dr Allen) bought in by ambulance for syncope with facial trauma, Daughter called [facility] and was bought to [facility] ER. In ED was hypotensisve SARS Cov + she is aox3      REVIEW OF SYSTEMS:  Constitutional: No fever,   ENMT:  No difficulty hearing, tinnitus, vertigo; No sinus or throat pain  Respiratory: No cough, wheezing, chills or hemoptysis, no sob  Cardiovascular: No chest pain, palpitations, dizziness or leg swelling  Gastrointestinal: No abdominal or epigastric pain. No nausea, vomiting or hematemesis; No diarrhea or constipation. No melena or hematochezia.  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      H/O carpal tunnel repair      History of appendectomy          FAMILY HISTORY:  Family history of essential hypertension (Father, Mother)        SOCIAL HISTORY:  Smoking Status: [ ] Current, [ ] Former, [ ] Never  Pack Years:    MEDICATIONS: Home Levothyroxine 25 mcg, xarelto 15 mg lisinopril 20 mg, rosuvastatin 10 mg prednisone 5 mg, metoprolol  MEDICATIONS  (STANDING):  levoFLOXacin IVPB 750 milliGRAM(s) IV Intermittent once    MEDICATIONS  (PRN):      Allergies    penicillin (Unknown)    Intolerances        Vital Signs Last 24 Hrs  T(C): 36.9 (16 Jan 2025 14:36), Max: 36.9 (16 Jan 2025 14:36)  T(F): 98.5 (16 Jan 2025 14:36), Max: 98.5 (16 Jan 2025 14:36)  HR: 109 (16 Jan 2025 16:24) (94 - 109)  BP: 98/54 (16 Jan 2025 16:24) (98/54 - 118/75)  BP(mean): --  RR: 18 (16 Jan 2025 16:24) (18 - 18)  SpO2: 99% (16 Jan 2025 16:24) (95% - 99%)    Parameters below as of 16 Jan 2025 16:24  Patient On (Oxygen Delivery Method): room air            PHYSICAL EXAM:    General:   in no acute distress  HEENT: MMM, conjunctiva and sclera clear, nose trauma  Heart: regular  Lungs: clear  Gastrointestinal: Soft, non-tender non-distended; Normal bowel sounds; No rebound or guarding  Extremities: Normal range of motion, No clubbing, cyanosis or edema  Neurological: Alert and oriented x3  Skin: Warm and dry. No obvious rash      LABS:                        10.1   11.56 )-----------( 191      ( 16 Jan 2025 15:17 )             30.9     01-16    138  |  96  |  22  ----------------------------<  133[H]  4.1   |  25  |  1.25    Ca    9.2      16 Jan 2025 15:17    TPro  6.9  /  Alb  3.9  /  TBili  0.5  /  DBili  x   /  AST  33  /  ALT  13  /  AlkPhos  50  01-16          RADIOLOGY & ADDITIONAL STUDIES:

## 2025-01-16 NOTE — H&P ADULT - PROBLEM SELECTOR PLAN 7
On admisison, Hgb 10.1. No recent baseline hgb 2016 8.2->8.8    Plan:   -iron studies in AM  -monitor hgb,if acute drop stat CT a/p  -tranfuse for hgb<7

## 2025-01-17 ENCOUNTER — RESULT REVIEW (OUTPATIENT)
Age: 89
End: 2025-01-17

## 2025-01-17 LAB
ALBUMIN SERPL ELPH-MCNC: 3.4 G/DL — SIGNIFICANT CHANGE UP (ref 3.3–5)
ALP SERPL-CCNC: 43 U/L — SIGNIFICANT CHANGE UP (ref 40–120)
ALT FLD-CCNC: 13 U/L — SIGNIFICANT CHANGE UP (ref 10–45)
ANION GAP SERPL CALC-SCNC: 14 MMOL/L — SIGNIFICANT CHANGE UP (ref 5–17)
AST SERPL-CCNC: 51 U/L — HIGH (ref 10–40)
BASOPHILS # BLD AUTO: 0.03 K/UL — SIGNIFICANT CHANGE UP (ref 0–0.2)
BASOPHILS NFR BLD AUTO: 0.4 % — SIGNIFICANT CHANGE UP (ref 0–2)
BILIRUB SERPL-MCNC: 0.4 MG/DL — SIGNIFICANT CHANGE UP (ref 0.2–1.2)
BUN SERPL-MCNC: 15 MG/DL — SIGNIFICANT CHANGE UP (ref 7–23)
CALCIUM SERPL-MCNC: 8.7 MG/DL — SIGNIFICANT CHANGE UP (ref 8.4–10.5)
CHLORIDE SERPL-SCNC: 103 MMOL/L — SIGNIFICANT CHANGE UP (ref 96–108)
CK SERPL-CCNC: 506 U/L — HIGH (ref 25–170)
CO2 SERPL-SCNC: 20 MMOL/L — LOW (ref 22–31)
CREAT SERPL-MCNC: 1.18 MG/DL — SIGNIFICANT CHANGE UP (ref 0.5–1.3)
EGFR: 44 ML/MIN/1.73M2 — LOW
EOSINOPHIL # BLD AUTO: 0.01 K/UL — SIGNIFICANT CHANGE UP (ref 0–0.5)
EOSINOPHIL NFR BLD AUTO: 0.1 % — SIGNIFICANT CHANGE UP (ref 0–6)
FERRITIN SERPL-MCNC: 126 NG/ML — SIGNIFICANT CHANGE UP (ref 13–330)
GLUCOSE SERPL-MCNC: 102 MG/DL — HIGH (ref 70–99)
HCT VFR BLD CALC: 29.3 % — LOW (ref 34.5–45)
HGB BLD-MCNC: 9.2 G/DL — LOW (ref 11.5–15.5)
IMM GRANULOCYTES NFR BLD AUTO: 0.6 % — SIGNIFICANT CHANGE UP (ref 0–0.9)
IRON SATN MFR SERPL: 16 UG/DL — LOW (ref 30–160)
IRON SATN MFR SERPL: 7 % — LOW (ref 14–50)
LYMPHOCYTES # BLD AUTO: 0.65 K/UL — LOW (ref 1–3.3)
LYMPHOCYTES # BLD AUTO: 7.8 % — LOW (ref 13–44)
MAGNESIUM SERPL-MCNC: 2.3 MG/DL — SIGNIFICANT CHANGE UP (ref 1.6–2.6)
MCHC RBC-ENTMCNC: 29.7 PG — SIGNIFICANT CHANGE UP (ref 27–34)
MCHC RBC-ENTMCNC: 31.4 G/DL — LOW (ref 32–36)
MCV RBC AUTO: 94.5 FL — SIGNIFICANT CHANGE UP (ref 80–100)
MONOCYTES # BLD AUTO: 0.61 K/UL — SIGNIFICANT CHANGE UP (ref 0–0.9)
MONOCYTES NFR BLD AUTO: 7.3 % — SIGNIFICANT CHANGE UP (ref 2–14)
NEUTROPHILS # BLD AUTO: 6.96 K/UL — SIGNIFICANT CHANGE UP (ref 1.8–7.4)
NEUTROPHILS NFR BLD AUTO: 83.8 % — HIGH (ref 43–77)
NRBC # BLD: 0 /100 WBCS — SIGNIFICANT CHANGE UP (ref 0–0)
NRBC BLD-RTO: 0 /100 WBCS — SIGNIFICANT CHANGE UP (ref 0–0)
PHOSPHATE SERPL-MCNC: 4.2 MG/DL — SIGNIFICANT CHANGE UP (ref 2.5–4.5)
PLATELET # BLD AUTO: 157 K/UL — SIGNIFICANT CHANGE UP (ref 150–400)
POTASSIUM SERPL-MCNC: 4.5 MMOL/L — SIGNIFICANT CHANGE UP (ref 3.5–5.3)
POTASSIUM SERPL-SCNC: 4.5 MMOL/L — SIGNIFICANT CHANGE UP (ref 3.5–5.3)
PROT SERPL-MCNC: 6.3 G/DL — SIGNIFICANT CHANGE UP (ref 6–8.3)
RBC # BLD: 3.1 M/UL — LOW (ref 3.8–5.2)
RBC # FLD: 15.1 % — HIGH (ref 10.3–14.5)
SODIUM SERPL-SCNC: 137 MMOL/L — SIGNIFICANT CHANGE UP (ref 135–145)
TIBC SERPL-MCNC: 238 UG/DL — SIGNIFICANT CHANGE UP (ref 220–430)
TSH SERPL-MCNC: 0.59 UIU/ML — SIGNIFICANT CHANGE UP (ref 0.27–4.2)
UIBC SERPL-MCNC: 222 UG/DL — SIGNIFICANT CHANGE UP (ref 110–370)
WBC # BLD: 8.31 K/UL — SIGNIFICANT CHANGE UP (ref 3.8–10.5)
WBC # FLD AUTO: 8.31 K/UL — SIGNIFICANT CHANGE UP (ref 3.8–10.5)

## 2025-01-17 PROCEDURE — 71045 X-RAY EXAM CHEST 1 VIEW: CPT | Mod: 26

## 2025-01-17 PROCEDURE — 99221 1ST HOSP IP/OBS SF/LOW 40: CPT

## 2025-01-17 PROCEDURE — 93306 TTE W/DOPPLER COMPLETE: CPT | Mod: 26

## 2025-01-17 RX ORDER — PREDNISONE 5 MG/1
1 TABLET ORAL
Refills: 0 | DISCHARGE

## 2025-01-17 RX ORDER — ROSUVASTATIN CALCIUM 10 MG/1
1 TABLET, FILM COATED ORAL
Refills: 0 | DISCHARGE

## 2025-01-17 RX ORDER — RIVAROXABAN 20 MG/1
15 TABLET, FILM COATED ORAL
Refills: 0 | Status: DISCONTINUED | OUTPATIENT
Start: 2025-01-17 | End: 2025-01-21

## 2025-01-17 RX ORDER — RIVAROXABAN 20 MG/1
1 TABLET, FILM COATED ORAL
Refills: 0 | DISCHARGE

## 2025-01-17 RX ORDER — REMDESIVIR 100 MG/1
100 INJECTION, POWDER, LYOPHILIZED, FOR SOLUTION INTRAVENOUS EVERY 24 HOURS
Refills: 0 | Status: COMPLETED | OUTPATIENT
Start: 2025-01-18 | End: 2025-01-21

## 2025-01-17 RX ORDER — LEVOTHYROXINE SODIUM 25 UG/1
25 TABLET ORAL
Refills: 0 | DISCHARGE

## 2025-01-17 RX ADMIN — Medication 100 MILLIGRAM(S): at 08:45

## 2025-01-17 RX ADMIN — PREDNISONE 5 MILLIGRAM(S): 5 TABLET ORAL at 00:50

## 2025-01-17 RX ADMIN — Medication 25 GRAM(S): at 00:54

## 2025-01-17 RX ADMIN — REMDESIVIR 200 MILLIGRAM(S): 100 INJECTION, POWDER, LYOPHILIZED, FOR SOLUTION INTRAVENOUS at 06:52

## 2025-01-17 RX ADMIN — RIVAROXABAN 15 MILLIGRAM(S): 20 TABLET, FILM COATED ORAL at 20:03

## 2025-01-17 RX ADMIN — LEVOTHYROXINE SODIUM 25 MICROGRAM(S): 25 TABLET ORAL at 06:52

## 2025-01-17 NOTE — PHYSICAL THERAPY INITIAL EVALUATION ADULT - GENERAL OBSERVATIONS, REHAB EVAL
Pt received semi supine in bed with +hep-lock, +NC~2L, +prima fit, NAD, side present. Pt left as found, NAD, call bell in reach, +bed alarm, aide present, RN EBER awares.

## 2025-01-17 NOTE — PROGRESS NOTE ADULT - PROBLEM SELECTOR PLAN 1
Presents with unwitnessed fall with head strike, NO LOC. Able to recall walking to bathroom feeling weak and falling. Able to walk back to bed by herself. Found with stool incontinence  No prior falls, no hx of seizure  On physical exam without focal deficit  CT head, maxillfacial, CT chest without acute findings. A tiny hypodense focus peripherally in the spleen, uncertain if this represents a small cyst or a tiny laceration.  ECG: HR 97, QTc 515, RBBB, (not seen in ECG from 2022) no significant ST wave changes  DDX: Likely orthostatic given hypotension vs low concern for seizure (as patient able to recall events) vs cardiogenic (loop recorder currently not working and no recent TTE available) vs vasovagal    Plan:   -check orthostatics in AM s/p fluids  -maintenance fluids overnight   -If change in MS, may consider vEEG  -Consider EP consult in AM to replace loop recorder  -TTE (f/u pro-BNP)  -PT consult  -fall precautions  -Monitor Hgb and VS closely given spleen finding ?cannot exclude hematoma Presents with unwitnessed fall with head strike, NO LOC. Able to recall walking to bathroom feeling weak and falling. Able to walk back to bed by herself. Found with stool incontinence  No prior falls, no hx of seizure  On physical exam without focal deficit  CT head, maxillofacial, CT chest without acute findings. A tiny hypodense focus peripherally in the spleen, uncertain if this represents a small cyst or a tiny laceration.  ECG: HR 97, QTc 515, RBBB, (not seen in ECG from 2022) no significant ST wave changes  DDX: Likely orthostatic given hypotension vs low concern for seizure (as patient able to recall events) vs cardiogenic (loop recorder currently not working and no recent TTE available) vs vasovagal  Noted to be orthostatic positive form lying to sitting to standing 126/ 82 to 113/70 to 90/49.   Plan:   - TTE (f/u pro-BNP)  - PT consult  - fall precautions  - Monitor Hgb and VS closely given spleen finding ?cannot exclude hematoma  - Consider EP consult in AM to replace loop recorder

## 2025-01-17 NOTE — PATIENT PROFILE ADULT - FUNCTIONAL ASSESSMENT - BASIC MOBILITY 6.
2-calculated by average/Not able to assess (calculate score using [facility] averaging method)

## 2025-01-17 NOTE — PROGRESS NOTE ADULT - PROBLEM SELECTOR PLAN 6
Home med per HIE: Metoprolol  qd and xarelto 15 qd    Plan:   - start metoprolol  qd  - restarting Xarelto 15mg likely cyst on imaging, stable HGB, VS CHADSVASC ~ 6, patient high risk for stroke   Home med per HIE: Metoprolol  qd and xarelto 15 qd    Plan:   - start metoprolol  qd  - restarting Xarelto 15mg likely cyst on imaging, stable HGB, VS

## 2025-01-17 NOTE — CONSULT NOTE ADULT - ATTENDING COMMENTS
Briefly, Patient is an 89 yo Female with PMH of Afib on Xarelto, Prior CVA, HTN, HLD CVA, polymyalgia rheumatica, and SCC of tongue (no hx of chemo), who presented post unwitnessed fall, found to be septic 2/2 COVID infection. Cardiology was consulted for concern of Heart Failure  - Patient seen and examined at bedside  - She reports poor functional status at baseline, with CARDOZO with 2 blocks which she attributes to her Afib  - She Follows closely with Dr Allen at [place] and reports normal Echo  - She was scheduled for outpatient NST prior to orthopedic procedure  - On admission, patient was found to be markedly hypotensive for which she received 3L  - CT chest was unrevealing, with only Mild pulmonary edema  - ECG reviewed showing NSR, with RBBB  - Clinically patient, is warm, well perfused and compensated. JVP is around 9 cm and she has faint bibasilar crackles and no lower extremity edema.  - Aid at bedside reports she has had poor PO intake only drinking small amoutns of fluids but with no overall appetite.  - Suspect patient has HFpEF likely exacerbated by acute covid infection and large volume resuscitation. Ideally would proceed with gentle diuresis however patient markedly Orthostatic ( SBP :126-->90) (DBP:82--->49)  - From an Afib standpoint, noted ILR on CXR. Would have EP interrogate to see if she has been in and out of Afib with RVR prior to hospitalization  - Given marked Orthostatic Hypotension, would DC Toprol 100.  - Would place patient on Lopressor 12.5 mg po BID to still allow some rate control starting 1/18 am and uptitrate as tolerated.  - Once no longer orthostatic patient would benefit from gentle diuresis.  - In the interim please mobilize, OOBTC, incentive spirometry  - Would place compressions stalkings.  - Pleas wean oxygen off as tolerates  - Please obtain outpatient collaterals from Dr Fox Allen  - Cardiology will cont to follow with you, please call with any questions

## 2025-01-17 NOTE — PROGRESS NOTE ADULT - PROBLEM SELECTOR PLAN 2
COVID + , Reports cough for few days, CXR with vascular congestion otherwise clear lungs    Plan:   -c/w remdesivier for 5 days   -f/u blood culture  -Collect sputum cltx, MRSA, urine strep/legionella (in case need for abx if signs of worsening infection/respiratory status worsen) COVID + , Reports cough for few days, CXR with vascular congestion otherwise clear lungs    Plan:   - c/w remdesiver for 5 days   - ESR, CRP daily  - f/u blood culture  - Collect sputum cltx, MRSA, urine strep/legionella (in case need for abx if signs of worsening infection/respiratory status worsen)

## 2025-01-17 NOTE — CONSULT NOTE ADULT - ASSESSMENT
I M    89 y o PMHx of significant for Xarelto, on Eliquis, polymyalgia rheumatica, L BCA, and SCC of tongue (no hx of chemo), who presents for unwitnessed fall found to have hypotension initially, with COVID PNA, admitted for further work up of fall and treatment of COVID     Problem/Plan - 1:  ·  Problem: Fall.   ·  Plan: Presents with unwitnessed fall with head strike, NO LOC. Able to recall walking to bathroom feeling weak and falling. Able to walk back to bed by herself. Found with stool incontinence  No prior falls, no hx of seizure  On physical exam without focal deficit  CT head, maxillfacial, CT chest without acute findings. A tiny hypodense focus peripherally in the spleen, uncertain if this represents a small cyst or a tiny laceration.  ECG: HR 97, QTc 515, RBBB, (not seen in ECG from 2022) no significant ST wave changes  DDX: Likely orthostatic given hypotension vs low concern for seizure (as patient able to recall events) vs cardiogenic (loop recorder currently not working and no recent TTE available) vs vasovagal    Plan:   -check orthostatics in AM s/p fluids  -maintenance fluids overnight   -If change in MS, may consider vEEG  -Consider EP consult in AM to replace loop recorder  -TTE (f/u pro-BNP)  -PT consult  -fall precautions  -Monitor Hgb and VS closely given spleen finding ?cannot exclude hematoma.    Problem/Plan - 2:  ·  Problem: Pneumonia due to COVID-19 virus.   ·  Plan: COVID +  Reports cough for few days   CXR with vascular congestion otherwise clear lungs    Plan:   -c/w remdesivier for 5 days   -f/u blood culture  -Collect sputum cltx, MRSA, urine strep/legionella (in case need for abx if signs of worsening infection/respiratory status worsen).    Problem/Plan - 3:  ·  Problem: Elevated troponin.   ·  Plan: Trop 373->307  ECG: HR 97, QTc 515, RBBB, (not seen in ECG from 2022) no significant ST wave changes    Plan:   -f/u cards recs (likely demand ischemia, no need for tele)  -repeat ECG in AM  -Avoid QT-c prolonging meds  -Maintain Mg>2, Ph>3.    Problem/Plan - 4:  ·  Problem: Hypothyroid.   ·  Plan: Home med: Levothyroxine 25 qd    Plan :  -c/w levothyroxine  -tsh in am.    Problem/Plan - 5:  ·  Problem: HTN (hypertension).   ·  Plan: home med: Valsartan 160qd and lisinopril 20 qd    Plan:   hold iso hypotension.    Problem/Plan - 6:  ·  Problem: Chronic atrial fibrillation.   ·  Plan: Home med per HIE: Metoprolol  qd and xarelto 15 qd    Plan:   -start metorpolol  qd  -Hold Xarelto for tonight (?spleen hematoma), if stable HGB, VS may consider restarting tmrw   -Med rec in AM.    Problem/Plan - 7:  ·  Problem: Anemia.   ·  Plan: On admisison, Hgb 10.1. No recent baseline hgb 2016 8.2->8.8    Plan:   -iron studies in AM  -monitor hgb,if acute drop stat CT a/p  -tranfuse for hgb<7.    Problem/Plan - 8:  ·  Problem: Polymyalgia rheumatica.   ·  Plan: home med: prednisone 5 qd    Plan:   -c/w prednisone 5 qd.    Problem/Plan - 9:  ·  Problem: Breast cancer.   ·  Plan: Tounge cancer  No hx of chemo, no active treatment     Plan:   -FREDERICK.    Problem/Plan - 10:  ·  Problem: Renal lesion.   ·  Plan; An indeterminate cystic lesion in the right kidney for which further characterization with ultrasound is advised on outpatient basis.    Plan:   -outpatinet f/u.    Problem/Plan - 11:  ·  Problem: Prophylactic measure.   ·  Plan: DVT ppx: hold  Diet: pend bedside dysphagia will start dash diet  Replete: Mg<2, Ph<3, K<4  Dispo: [facility].

## 2025-01-17 NOTE — PROGRESS NOTE ADULT - SUBJECTIVE AND OBJECTIVE BOX
Patient is a 89y old  Female who presents with a chief complaint of Fall (17 Jan 2025 09:40)      Hospital course:     OVERNIGHT EVENTS: ON: pro-bnp 19,000 no LE edema, seen by cards in the ED?, ECG new RBBB. cards tele for new heart failure, good pressures responding to fluids,     SUBJECTIVE: Patient was examined at bedside this morning. She reports no fevers, chills, no chest pain, no palpitations, last BM was 1/16, she states that she has not swelling in the lower edema.   States she received her medications at [facility] [address]     ROS: otherwise negative      T(C): 36.9 (01-17-25 @ 06:06), Max: 37.5 (01-16-25 @ 19:28)  HR: 97 (01-17-25 @ 07:03) (76 - 109)  BP: 117/70 (01-17-25 @ 07:03) (96/52 - 139/76)  RR: 18 (01-17-25 @ 06:06) (18 - 20)  SpO2: 94% (01-17-25 @ 06:06) (94% - 99%)  Wt(kg): --Vital Signs Last 24 Hrs  T(C): 36.9 (17 Jan 2025 06:06), Max: 37.5 (16 Jan 2025 19:28)  T(F): 98.4 (17 Jan 2025 06:06), Max: 99.5 (16 Jan 2025 19:28)  HR: 97 (17 Jan 2025 07:03) (76 - 109)  BP: 117/70 (17 Jan 2025 07:03) (96/52 - 139/76)  BP(mean): 86 (17 Jan 2025 07:03) (70 - 86)  RR: 18 (17 Jan 2025 06:06) (18 - 20)  SpO2: 94% (17 Jan 2025 06:06) (94% - 99%)    Parameters below as of 17 Jan 2025 06:06  Patient On (Oxygen Delivery Method): nasal cannula        PHYSICAL EXAM:  Constitutional: resting comfortably in bed; NAD  Head: NC/AT  Eyes: PERRL, EOMI, anicteric sclera  ENT: no nasal discharge; MMM  Neck: supple; no JVD or thyromegaly  Respiratory: CTA B/L; no W/R/R, no retractions  Cardiac: +S1/S2; RRR; no M/R/G  Gastrointestinal: soft, NT/ND; no rebound or guarding; +BSx4  Back: spine midline, no bony tenderness or step-offs; no CVAT B/L  Extremities: WWP, no clubbing or cyanosis; no peripheral edema. Capillary refill <2 sec  Musculoskeletal: NROM x4; no joint swelling, tenderness or erythema  Vascular: 2+ radial, DP/PT pulses B/L  Dermatologic: skin warm, dry and intact; no rashes, wounds, or scars  Lymphatic: no submandibular or cervical LAD  Neurologic: AAOx3; CNII-XII grossly intact; no focal deficits  Psychiatric: affect and characteristics of appearance, verbalizations, behaviors are appropriate    LABS:                        9.2    8.31  )-----------( 157      ( 17 Jan 2025 05:30 )             29.3     01-17    137  |  103  |  15  ----------------------------<  102[H]  4.5   |  20[L]  |  1.18    Ca    8.7      17 Jan 2025 05:30  Phos  4.2     01-17  Mg     2.3     01-17    TPro  6.3  /  Alb  3.4  /  TBili  0.4  /  DBili  x   /  AST  51[H]  /  ALT  13  /  AlkPhos  43  01-17    Iron 16, TIBC 238, %Sat 7, Ferritin 126/ 01-17-25 @ 05:30    PT/INR - ( 16 Jan 2025 15:17 )   PT: 14.6 sec;   INR: 1.27          PTT - ( 16 Jan 2025 15:17 )  PTT:33.9 sec  Urinalysis Basic - ( 17 Jan 2025 05:30 )    Color: x / Appearance: x / SG: x / pH: x  Gluc: 102 mg/dL / Ketone: x  / Bili: x / Urobili: x   Blood: x / Protein: x / Nitrite: x   Leuk Esterase: x / RBC: x / WBC x   Sq Epi: x / Non Sq Epi: x / Bacteria: x      CAPILLARY BLOOD GLUCOSE            Urinalysis Basic - ( 17 Jan 2025 05:30 )    Color: x / Appearance: x / SG: x / pH: x  Gluc: 102 mg/dL / Ketone: x  / Bili: x / Urobili: x   Blood: x / Protein: x / Nitrite: x   Leuk Esterase: x / RBC: x / WBC x   Sq Epi: x / Non Sq Epi: x / Bacteria: x        MEDICATIONS  (STANDING):  influenza  Vaccine (HIGH DOSE) 0.5 milliLiter(s) IntraMuscular once  levothyroxine 25 MICROGram(s) Oral daily  metoprolol succinate  milliGRAM(s) Oral every 24 hours  predniSONE   Tablet 5 milliGRAM(s) Oral daily  remdesivir  IVPB   IV Intermittent     MEDICATIONS  (PRN):  acetaminophen     Tablet .. 650 milliGRAM(s) Oral every 6 hours PRN Temp greater or equal to 38C (100.4F), Mild Pain (1 - 3)  aluminum hydroxide/magnesium hydroxide/simethicone Suspension 30 milliLiter(s) Oral every 4 hours PRN Dyspepsia  melatonin 3 milliGRAM(s) Oral at bedtime PRN Insomnia      RADIOLOGY & ADDITIONAL TESTS: Reviewed   Patient is a 89y old  Female who presents with a chief complaint of Fall (17 Jan 2025 09:40)      Hospital course: Patient is 89 y.o PMHx of significant for Xarelto, on Eliquis, polymyalgia rheumatica, L BCA, and SCC of tongue (no hx of chemo), who presents for unwitnessed fall found to have hypotension initially, with COVID PNA, admitted for further work up of fall and treatment of COVID. She       OVERNIGHT EVENTS: ON: pro-bnp 19,000 no LE edema, seen by cards in the ED?, ECG new RBBB. cards tele for new heart failure, good pressures responding to fluids,     SUBJECTIVE: Patient was examined at bedside this morning. She reports no fevers, chills, no chest pain, no palpitations, last BM was 1/16, she states that she has not swelling in the lower edema.   States she received her medications at [facility] [address]     ROS: otherwise negative      T(C): 36.9 (01-17-25 @ 06:06), Max: 37.5 (01-16-25 @ 19:28)  HR: 97 (01-17-25 @ 07:03) (76 - 109)  BP: 117/70 (01-17-25 @ 07:03) (96/52 - 139/76)  RR: 18 (01-17-25 @ 06:06) (18 - 20)  SpO2: 94% (01-17-25 @ 06:06) (94% - 99%)  Wt(kg): --Vital Signs Last 24 Hrs  T(C): 36.9 (17 Jan 2025 06:06), Max: 37.5 (16 Jan 2025 19:28)  T(F): 98.4 (17 Jan 2025 06:06), Max: 99.5 (16 Jan 2025 19:28)  HR: 97 (17 Jan 2025 07:03) (76 - 109)  BP: 117/70 (17 Jan 2025 07:03) (96/52 - 139/76)  BP(mean): 86 (17 Jan 2025 07:03) (70 - 86)  RR: 18 (17 Jan 2025 06:06) (18 - 20)  SpO2: 94% (17 Jan 2025 06:06) (94% - 99%)    Parameters below as of 17 Jan 2025 06:06  Patient On (Oxygen Delivery Method): nasal cannula        PHYSICAL EXAM:  Constitutional: resting comfortably in bed; NAD  Head: NC/AT  Eyes: PERRL, EOMI, anicteric sclera  ENT: no nasal discharge; MMM  Neck: supple; no JVD or thyromegaly  Respiratory: CTA B/L; no W/R/R, no retractions  Cardiac: +S1/S2; RRR; no M/R/G  Gastrointestinal: soft, NT/ND; no rebound or guarding; +BSx4  Back: spine midline, no bony tenderness or step-offs; no CVAT B/L  Extremities: WWP, no clubbing or cyanosis; no peripheral edema. Capillary refill <2 sec  Musculoskeletal: NROM x4; no joint swelling, tenderness or erythema  Vascular: 2+ radial, DP/PT pulses B/L  Dermatologic: skin warm, dry and intact; no rashes, wounds, or scars  Lymphatic: no submandibular or cervical LAD  Neurologic: AAOx3; CNII-XII grossly intact; no focal deficits  Psychiatric: affect and characteristics of appearance, verbalizations, behaviors are appropriate    LABS:                        9.2    8.31  )-----------( 157      ( 17 Jan 2025 05:30 )             29.3     01-17    137  |  103  |  15  ----------------------------<  102[H]  4.5   |  20[L]  |  1.18    Ca    8.7      17 Jan 2025 05:30  Phos  4.2     01-17  Mg     2.3     01-17    TPro  6.3  /  Alb  3.4  /  TBili  0.4  /  DBili  x   /  AST  51[H]  /  ALT  13  /  AlkPhos  43  01-17    Iron 16, TIBC 238, %Sat 7, Ferritin 126/ 01-17-25 @ 05:30    PT/INR - ( 16 Jan 2025 15:17 )   PT: 14.6 sec;   INR: 1.27          PTT - ( 16 Jan 2025 15:17 )  PTT:33.9 sec  Urinalysis Basic - ( 17 Jan 2025 05:30 )    Color: x / Appearance: x / SG: x / pH: x  Gluc: 102 mg/dL / Ketone: x  / Bili: x / Urobili: x   Blood: x / Protein: x / Nitrite: x   Leuk Esterase: x / RBC: x / WBC x   Sq Epi: x / Non Sq Epi: x / Bacteria: x      CAPILLARY BLOOD GLUCOSE            Urinalysis Basic - ( 17 Jan 2025 05:30 )    Color: x / Appearance: x / SG: x / pH: x  Gluc: 102 mg/dL / Ketone: x  / Bili: x / Urobili: x   Blood: x / Protein: x / Nitrite: x   Leuk Esterase: x / RBC: x / WBC x   Sq Epi: x / Non Sq Epi: x / Bacteria: x        MEDICATIONS  (STANDING):  influenza  Vaccine (HIGH DOSE) 0.5 milliLiter(s) IntraMuscular once  levothyroxine 25 MICROGram(s) Oral daily  metoprolol succinate  milliGRAM(s) Oral every 24 hours  predniSONE   Tablet 5 milliGRAM(s) Oral daily  remdesivir  IVPB   IV Intermittent     MEDICATIONS  (PRN):  acetaminophen     Tablet .. 650 milliGRAM(s) Oral every 6 hours PRN Temp greater or equal to 38C (100.4F), Mild Pain (1 - 3)  aluminum hydroxide/magnesium hydroxide/simethicone Suspension 30 milliLiter(s) Oral every 4 hours PRN Dyspepsia  melatonin 3 milliGRAM(s) Oral at bedtime PRN Insomnia      RADIOLOGY & ADDITIONAL TESTS: Reviewed   Patient is a 89y old  Female who presents with a chief complaint of Fall (17 Jan 2025 09:40)      Hospital course: Patient is 89 y.o PMHx of significant for Xarelto, on Eliquis, polymyalgia rheumatica, L BCA, and SCC of tongue (no hx of chemo), who presents for unwitnessed fall found to have hypotension initially, with COVID PNA, admitted for further work up of fall and treatment of COVID. She was started on remdesiver for the management of her covid infection. For the AFib there were initial concerns for splenic laceration/ bleed iso of which our xarelto was held, it was then restarted after repeat labwork and after it was noted that this lesion on imaging was likely 2/2 to a cyst. Noted to have increasing O2 requirements after fluid bolus, pro-bnp 19,000, however, no LE edema concerning for heart failure for which an TTE is pending and cardiology was consulted. patient currently pending further cardiac work up.     OVERNIGHT EVENTS: ON: pro-bnp 19,000 no LE edema, seen by cards in the ED?, ECG new RBBB. cards tele for new heart failure, good pressures responding to fluids,     SUBJECTIVE: Patient was examined at bedside this morning. She reports no fevers, chills, no chest pain, no palpitations, last BM was 1/16, she states that she has not swelling in the lower edema.   States she received her medications at [facility] [address]     ROS: otherwise negative      T(C): 36.9 (01-17-25 @ 06:06), Max: 37.5 (01-16-25 @ 19:28)  HR: 97 (01-17-25 @ 07:03) (76 - 109)  BP: 117/70 (01-17-25 @ 07:03) (96/52 - 139/76)  RR: 18 (01-17-25 @ 06:06) (18 - 20)  SpO2: 94% (01-17-25 @ 06:06) (94% - 99%)  Wt(kg): --Vital Signs Last 24 Hrs  T(C): 36.9 (17 Jan 2025 06:06), Max: 37.5 (16 Jan 2025 19:28)  T(F): 98.4 (17 Jan 2025 06:06), Max: 99.5 (16 Jan 2025 19:28)  HR: 97 (17 Jan 2025 07:03) (76 - 109)  BP: 117/70 (17 Jan 2025 07:03) (96/52 - 139/76)  BP(mean): 86 (17 Jan 2025 07:03) (70 - 86)  RR: 18 (17 Jan 2025 06:06) (18 - 20)  SpO2: 94% (17 Jan 2025 06:06) (94% - 99%)    Parameters below as of 17 Jan 2025 06:06  Patient On (Oxygen Delivery Method): nasal cannula        PHYSICAL EXAM:  Constitutional: Resting comfortably in bed; NAD  Head: notable laceration on forehead   Eyes: PERRL, EOMI, clear conjunctiva  ENT: no nasal discharge; dry mucous membranes  Neck: supple;   Respiratory: CTA B/L; no W/R/R, no retractions  Cardiac: +S1/S2; RRR; no M/R/G;  Gastrointestinal: soft, NT/ND; no rebound or guarding; +BSx4  Extremities: WWP, no clubbing or cyanosis; no peripheral edema  Musculoskeletal:; no joint swelling, tenderness or erythema  Vascular: 2+ radial, femoral, DP/PT pulses B/L  Dermatologic: skin warm, dry and intact; no rashes, wounds, or scars  Neurologic: AAOx2 (person and place, not time); strength 4/5 in UE and LE bilaterally  Psychiatric: affect and characteristics of appearance, verbalizations, behaviors are appropriate    LABS:                        9.2    8.31  )-----------( 157      ( 17 Jan 2025 05:30 )             29.3     01-17    137  |  103  |  15  ----------------------------<  102[H]  4.5   |  20[L]  |  1.18    Ca    8.7      17 Jan 2025 05:30  Phos  4.2     01-17  Mg     2.3     01-17    TPro  6.3  /  Alb  3.4  /  TBili  0.4  /  DBili  x   /  AST  51[H]  /  ALT  13  /  AlkPhos  43  01-17    Iron 16, TIBC 238, %Sat 7, Ferritin 126/ 01-17-25 @ 05:30    PT/INR - ( 16 Jan 2025 15:17 )   PT: 14.6 sec;   INR: 1.27          PTT - ( 16 Jan 2025 15:17 )  PTT:33.9 sec  Urinalysis Basic - ( 17 Jan 2025 05:30 )    Color: x / Appearance: x / SG: x / pH: x  Gluc: 102 mg/dL / Ketone: x  / Bili: x / Urobili: x   Blood: x / Protein: x / Nitrite: x   Leuk Esterase: x / RBC: x / WBC x   Sq Epi: x / Non Sq Epi: x / Bacteria: x      CAPILLARY BLOOD GLUCOSE            Urinalysis Basic - ( 17 Jan 2025 05:30 )    Color: x / Appearance: x / SG: x / pH: x  Gluc: 102 mg/dL / Ketone: x  / Bili: x / Urobili: x   Blood: x / Protein: x / Nitrite: x   Leuk Esterase: x / RBC: x / WBC x   Sq Epi: x / Non Sq Epi: x / Bacteria: x        MEDICATIONS  (STANDING):  influenza  Vaccine (HIGH DOSE) 0.5 milliLiter(s) IntraMuscular once  levothyroxine 25 MICROGram(s) Oral daily  metoprolol succinate  milliGRAM(s) Oral every 24 hours  predniSONE   Tablet 5 milliGRAM(s) Oral daily  remdesivir  IVPB   IV Intermittent     MEDICATIONS  (PRN):  acetaminophen     Tablet .. 650 milliGRAM(s) Oral every 6 hours PRN Temp greater or equal to 38C (100.4F), Mild Pain (1 - 3)  aluminum hydroxide/magnesium hydroxide/simethicone Suspension 30 milliLiter(s) Oral every 4 hours PRN Dyspepsia  melatonin 3 milliGRAM(s) Oral at bedtime PRN Insomnia      RADIOLOGY & ADDITIONAL TESTS: Reviewed

## 2025-01-17 NOTE — PHYSICAL THERAPY INITIAL EVALUATION ADULT - GAIT DEVIATIONS NOTED, PT EVAL
unsteady gait, no lose of balance,/increased time in double stance/decreased weight-shifting ability

## 2025-01-17 NOTE — CONSULT NOTE ADULT - SUBJECTIVE AND OBJECTIVE BOX
Patient is a 89y old  Female who presents with a chief complaint of Fall (17 Jan 2025 01:40)      HPI:  Patient is 89 y.o PMHx of significant for Afib on Xarelto, polymyalgia rheumatica, L BCA, and SCC of tongue (no hx of chemo), who presents for unwitnessed fall.  Patient presented for unwitnessed fall with head strike. Per daughter at bedside, mom was last seen by friends last night in usual state of health. This morning daughter called patient not answering. Other daughter then went home found patient in bed, appeared drowsy, was noted to be incontinent of stool  and called EMS. Upon arrival of EMS: She was found hypotensive, tachycardic, with fever received 500cc of NS and Tylenol for fever. As per daughter, pt had been coughing x few days. Upon arrivial to ED: after IV fluids pt much improved, able to recall walking to bathroom, feeling weak and falling, hitting face on ground. Denies loc.   In the ED: FAST exam performed and neg for free fluid. Pt evaluated by cards given elevated trop: thought to be due to demand ischemia from COVID/ hypotension; no indications for cards tele.     In the ED:   VS: T 98.5, T 105->97, BP 96/52->111/55, spo2 96% in RA, rr 18  Labs: WBC 11.56, Hgb 10.1, Plt 191, Trop 373->307, Lactate 2.1->1.8, Bicarb 25, AG 17, creatinine 1.25, COVID +,   Imaging:   CT HEAD: No acute abnormality. Chronic changes as above. Extensive periventricular hypoattenuation, compatible with advanced chronic microvascular ischemic changes. There is a small chronic lacunar infarct in the left corona radiata  CT FACIAL BONE: No acute abnormality  CT chest, a/p: No acute abnormality in the chest. A tiny hypodense focus peripherally in the spleen, uncertain if this represents a small cyst or a tiny laceration. No associated fluid or fatty stranding.  No acute rib fractures. An indeterminate cystic lesion in the right kidney for which further characterization with ultrasound is advised on outpatient basis.    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 650 milliGRAM(s) Oral every 6 hours PRN Temp greater or equal to 38C (100.4F), Mild Pain (1 - 3)  aluminum hydroxide/magnesium hydroxide/simethicone Suspension 30 milliLiter(s) Oral every 4 hours PRN Dyspepsia  melatonin 3 milliGRAM(s) Oral at bedtime PRN Insomnia        FAMILY HISTORY:  Family history of essential hypertension (Father, Mother)        CBC Full  -  ( 16 Jan 2025 15:17 )  WBC Count : 11.56 K/uL  RBC Count : 3.38 M/uL  Hemoglobin : 10.1 g/dL  Hematocrit : 30.9 %  Platelet Count - Automated : 191 K/uL  Mean Cell Volume : 91.4 fl  Mean Cell Hemoglobin : 29.9 pg  Mean Cell Hemoglobin Concentration : 32.7 g/dL  Auto Neutrophil # : 8.96 K/uL  Auto Lymphocyte # : 1.18 K/uL  Auto Monocyte # : 1.28 K/uL  Auto Eosinophil # : 0.01 K/uL  Auto Basophil # : 0.04 K/uL  Auto Neutrophil % : 77.5 %  Auto Lymphocyte % : 10.2 %  Auto Monocyte % : 11.1 %  Auto Eosinophil % : 0.1 %  Auto Basophil % : 0.3 %      01-16    138  |  96  |  22  ----------------------------<  133[H]  4.1   |  25  |  1.25    Ca    9.2      16 Jan 2025 15:17  Phos  4.3     01-16  Mg     1.5     01-16    TPro  6.9  /  Alb  3.9  /  TBili  0.5  /  DBili  x   /  AST  33  /  ALT  13  /  AlkPhos  50  01-16      Urinalysis Basic - ( 16 Jan 2025 22:27 )    Color: Yellow / Appearance: Clear / SG: >1.030 / pH: x  Gluc: x / Ketone: Negative mg/dL  / Bili: Negative / Urobili: 0.2 mg/dL   Blood: x / Protein: 30 mg/dL / Nitrite: Negative   Leuk Esterase: Negative / RBC: 1 /HPF / WBC 0 /HPF   Sq Epi: x / Non Sq Epi: 0 /HPF / Bacteria: Negative /HPF        Radiology :     < from: CT Head No Cont (01.16.25 @ 17:26) >    ACC: [id number] EXAM:  CT MAXILLOFACIAL   ORDERED BY: JUNE H REE     ACC: [id number] EXAM:  CT BRAIN   ORDERED BY: JUNE H REE     PROCEDURE DATE:  01/16/2025          INTERPRETATION:  CLINICAL INDICATION: Trauma.    Technique: Noncontrast axial CT of the head and facial bones was   performed. 3-D reformats of the facial bones was obtained. Coronal and   sagittal reformats were obtained.      COMPARISON: None.    FINDINGS:  Head CT:  The ventricles and sulci are prominent, compatible with severe   generalized cerebral volume loss. Extensive periventricular   hypoattenuation, compatible with advanced chronic microvascular ischemic   changes. There is a small chronic lacunar infarct in the left corona   radiata There is no intraparenchymal hematoma, mass effect or midline   shift. No abnormal extra-axial fluid collections or hemorrhages are   present.    The calvarium is intact.    Facial bone CT:    No acute facial fracture.    There are scattered mucosal inflammatory changes in the paranasalsinuses.    Mastoid air cells are clear.    Status post bilateral lens replacement.    Soft tissues appear unremarkable.        IMPRESSION:  CT HEAD: No acute abnormality. Chronic changes as above.    CT FACIAL BONE: No acute abnormality      < from: CT Chest No Cont (01.16.25 @ 17:26) >  ACC: [id number] EXAM:  CT ABDOMEN AND PELVIS IC   ORDERED BY: JUNE H REE     ACC: [id number] EXAM:  CT CHEST   ORDERED BY: JUNE H REE     PROCEDURE DATE:  01/16/2025          INTERPRETATION:  CLINICAL INFORMATION: Status post fall, on blood   thinners.Left-sided pain.    COMPARISON: None.    CONTRAST/COMPLICATIONS:  IV Contrast: Isovue 370 90 cc administered   10 cc discarded  Oral Contrast: NONE  .    PROCEDURE:  CT of the Chest, Abdomen and Pelvis was performed.  Imaging through the chest was performed without contrast, followed by   contrast-enhanced imaging through the abdomen and pelvis.  Sagittal and coronal reformats were performed.    FINDINGS:  CHEST:  LUNGS AND LARGE AIRWAYS: Patent central airways. Calcified granuloma in   the left lower lobe. Mild bronchiectasis in the lower lobes.  PLEURA: No pleural effusion.  VESSELS: Within normal limits.  HEART: Cardiomegaly. No pericardial effusion. Mitral annular   calcifications.  MEDIASTINUM AND JOHN: No lymphadenopathy.  CHEST WALL AND LOWER NECK: Right thyroid nodules measuring up to 8mm.    ABDOMEN AND PELVIS:  LIVER: Within normal limits.  BILE DUCTS: Normal caliber.  GALLBLADDER: Cholelithiasis.  SPLEEN: A 5 mm peripheral hypodense focus in the spleen (3, 25 and 4,   32). No adjacent fluid.  PANCREAS: Cystic lesions in the tail, largest measuring 1.8 cm (3, 34).  ADRENALS: Within normal limits.  KIDNEYS/URETERS: 3.4 cm cystic lesion in the midpole of right kidney (3,   49) is indeterminate. Another simple cyst superiorly in the right kidney.    BLADDER: Within normal limits.  REPRODUCTIVE ORGANS: Fibroids.    BOWEL: No bowel obstruction. Small hiatal hernia. Appendix is not   visualized. No evidence of inflammation in the pericecal region. Colonic   diverticulosis without acute diverticulitis. A slightly prominent lymph   node adjacent to distal esophagus (3, 17).  PERITONEUM/RETROPERITONEUM: Within normal limits.  VESSELS: Atherosclerotic changes.  LYMPH NODES: No lymphadenopathy.  ABDOMINAL WALL: Within normal limits.  BONES: Degenerative changes. Osteopenia.    IMPRESSION:  No acute abnormality in the chest.  A tiny hypodense focus peripherally in the spleen, uncertain if this   represents a small cyst or a tiny laceration. No associated fluid or   fatty stranding.  No acute rib fractures.  An indeterminate cystic lesion in the right kidney for which further   characterization with ultrasound is advised on outpatient basis.          Review of Systems : per HPI         Vital Signs Last 24 Hrs  T(C): 36.9 (17 Jan 2025 06:06), Max: 37.5 (16 Jan 2025 19:28)  T(F): 98.4 (17 Jan 2025 06:06), Max: 99.5 (16 Jan 2025 19:28)  HR: 97 (17 Jan 2025 07:03) (76 - 109)  BP: 117/70 (17 Jan 2025 07:03) (96/52 - 139/76)  BP(mean): 86 (17 Jan 2025 07:03) (70 - 86)  RR: 18 (17 Jan 2025 06:06) (18 - 20)  SpO2: 94% (17 Jan 2025 06:06) (94% - 99%)    Parameters below as of 17 Jan 2025 06:06  Patient On (Oxygen Delivery Method): nasal cannula            Physical Exam:  on [place] and [employer]TACT COVID 19  isolation ,  89 y o man lying comfortably in semi Maciel's position , awake , alert , no acute complaints     Head: normocephalic , atraumatic    Eyes: PERRLA , EOMI , no nystagmus , sclera anicteric    ENT / FACE: neg nasal discharge , uvula midline , no oropharyngeal erythema / exudate    Neck: supple , negative JVD , negative carotid bruits , no thyromegaly    Chest: CTA bilaterally     Cardiovascular: regular rate and rhythm , neg murmurs / rubs / gallops    Abdomen: soft , non distended , no tenderness to palpation in all 4 quadrants ,  normal bowel sounds     Extremities: WWP , neg cyanosis /clubbing / edema     Neurologic Exam:     Alert and oriented to person , place    Cranial Nerves:           II:                         pupils equal , round and reactive to light , visual fields intact         III/ IV/VI:             extraocular movements intact , neg nystagmus , neg ptosis        V:                        facial sensation intact , V1-3 normal        VII:                      face symmetric , no droop , normal eye closure and smile        VIII:                     hearing intact to finger rub bilaterally        IX and X:             no hoarseness , gag intact , palate/ uvula rise symmetrically        XI:                       SCM / trapezius strength intact bilateral        XII:                      no tongue deviation    Motor Exam:        > 3+/5 x 4 extremities , without drift     Sensation:         intact to light touch x 4 extremities                            no neglect or extinction on double simultaneous testing    DTR:           biceps/brachioradialis: equal                            patella/ankle: equal          neg Babinski     Coordination:            Finger to Nose:  neg dysmetria bilaterally        Gait:  not tested         PM&R Impression: admitted for  unwitnessed fall found to have hypotension initially, with COVID PNA    - no acute pathology on CT brain imaging     - deconditioned    - no focal weakness        Recommendations / Plan:       1) Physical / Occupational therapy focusing on therapeutic exercises , equipment evaluation , bed mobility/transfer out of bed evaluation , progressive ambulation with assistive devices prn .    2) Current disposition plan recommendation:    pending functional progress

## 2025-01-17 NOTE — PATIENT PROFILE ADULT - FALL HARM RISK - HARM RISK INTERVENTIONS

## 2025-01-17 NOTE — CONSULT NOTE ADULT - SUBJECTIVE AND OBJECTIVE BOX
HPI: 89F PMH HTN, HLD, afib on xarelto, hypothyroidism, TIA, polymyalgia rheumatica, and SCC of tongue (no hx of chemo), who presents for unwitnessed fall with head strike. Per daughter at bedside, mom was last seen by friends last night in usual state of health. This morning daughter called patient not answering. Other daughter then went home found patient in bed, appeared drowsy, was noted to be incontinent of stool  and called EMS. Upon arrival of EMS: She was found hypotensive, tachycardic, with fever received 500cc of NS and Tylenol for fever. As per daughter, pt had been coughing x few days. Upon arrival to ED: after IV fluids pt much improved, able to recall walking to bathroom, feeling weak and falling, hitting face on ground. Denies LOC.  Per EMS patient initially hypotensive and altered which improved with IVF resuscitation. CT head negative for acute bleed, CT chest largely unremarkable, patient found to be COVID positive. She received 3L NS and admitted to [facility] for management of fall and COVID. Pt evaluated by cardiac tele attending for elevated troponin, thought to be due to demand ischemia from COVID/ hypotension; no indications for cardiac tele admission. Cardiology consulted for elevated troponin and evaluation for CHF due to elevated proBNP.     SUBJECTIVE / CONSULTANT HPI: Patient seen and examined at bedside. Reports she is feeling fine just tired. Reports today she was walking to the bathroom when she felt lightheaded, dizzy and weak and then fell down on the floor. This has never happened to her before. Denies any LOC and reports she was awake the whole time. She tried to get up but was only able to crawl and was ultimately found down by her daughter. She does not recall how long she was down for. She denies any chest pain or SOB and denies any CP or SOB today during incident. Denies any history of CHF. Says she takes her xarelto every day. Denies any LE edema. Reports she sometimes feels SOB at home b ut does not know how often or how long, unable to provide history surrounding this. She follows with outpatient cardiologist Dr. Allen (she does not recall where he works but per [facility]) and has never been diagnosed with CHF. She does not remember the last echo that was done or when it was done or what the results were. She denies any SOB currently and says her breathing is fine.       PHYSICAL EXAM:    General: lying in bed in NAD   HEENT: abrasions on nose with dried blood  Neck: no JVD   Cardiovascular: +S1/S2, tachy rate, regular rhythm, no murmurs   Respiratory: CTA B/L   Extremities: WWP; no LE edema  Neurological: AAOx3    VITAL SIGNS:  Vital Signs Last 24 Hrs  T(C): 36.7 (17 Jan 2025 00:02), Max: 37.5 (16 Jan 2025 19:28)  T(F): 98 (17 Jan 2025 00:02), Max: 99.5 (16 Jan 2025 19:28)  HR: 76 (17 Jan 2025 00:02) (76 - 109)  BP: 137/85 (17 Jan 2025 00:02) (96/52 - 139/76)  BP(mean): --  RR: 20 (17 Jan 2025 00:02) (18 - 20)  SpO2: 98% (17 Jan 2025 00:02) (95% - 99%)    Parameters below as of 17 Jan 2025 00:02  Patient On (Oxygen Delivery Method): nasal cannula  O2 Flow (L/min): 2        MEDICATIONS:  MEDICATIONS  (STANDING):  influenza  Vaccine (HIGH DOSE) 0.5 milliLiter(s) IntraMuscular once  levothyroxine 25 MICROGram(s) Oral daily  metoprolol succinate  milliGRAM(s) Oral every 24 hours  predniSONE   Tablet 5 milliGRAM(s) Oral daily  remdesivir  IVPB 200 milliGRAM(s) IV Intermittent every 24 hours  remdesivir  IVPB   IV Intermittent     MEDICATIONS  (PRN):  acetaminophen     Tablet .. 650 milliGRAM(s) Oral every 6 hours PRN Temp greater or equal to 38C (100.4F), Mild Pain (1 - 3)  aluminum hydroxide/magnesium hydroxide/simethicone Suspension 30 milliLiter(s) Oral every 4 hours PRN Dyspepsia  melatonin 3 milliGRAM(s) Oral at bedtime PRN Insomnia      ALLERGIES:  Allergies    penicillin (Unknown)    Intolerances        LABS:                        10.1   11.56 )-----------( 191      ( 16 Jan 2025 15:17 )             30.9     01-16    138  |  96  |  22  ----------------------------<  133[H]  4.1   |  25  |  1.25    Ca    9.2      16 Jan 2025 15:17  Phos  4.3     01-16  Mg     1.5     01-16    TPro  6.9  /  Alb  3.9  /  TBili  0.5  /  DBili  x   /  AST  33  /  ALT  13  /  AlkPhos  50  01-16    PT/INR - ( 16 Jan 2025 15:17 )   PT: 14.6 sec;   INR: 1.27          PTT - ( 16 Jan 2025 15:17 )  PTT:33.9 sec  Urinalysis Basic - ( 16 Jan 2025 22:27 )    Color: Yellow / Appearance: Clear / SG: >1.030 / pH: x  Gluc: x / Ketone: Negative mg/dL  / Bili: Negative / Urobili: 0.2 mg/dL   Blood: x / Protein: 30 mg/dL / Nitrite: Negative   Leuk Esterase: Negative / RBC: 1 /HPF / WBC 0 /HPF   Sq Epi: x / Non Sq Epi: 0 /HPF / Bacteria: Negative /HPF      CAPILLARY BLOOD GLUCOSE          RADIOLOGY & ADDITIONAL TESTS: Reviewed.       HPI: 89F PMH HTN, HLD, afib on xarelto, hypothyroidism, CVA, polymyalgia rheumatica, and SCC of tongue (no hx of chemo), who presents for unwitnessed fall with head strike. Per daughter at bedside, mom was last seen by friends last night in usual state of health. This morning daughter called patient not answering. Other daughter then went home found patient in bed, appeared drowsy, was noted to be incontinent of stool  and called EMS. Upon arrival of EMS: She was found hypotensive, tachycardic, with fever received 500cc of NS and Tylenol for fever. As per daughter, pt had been coughing x few days. Upon arrival to ED: after IV fluids pt much improved, able to recall walking to bathroom, feeling weak and falling, hitting face on ground. Denies LOC.  Per EMS patient initially hypotensive and altered which improved with IVF resuscitation. CT head negative for acute bleed, CT chest largely unremarkable, patient found to be COVID positive. She received 3L NS and admitted to [facility] for management of fall and COVID. Pt evaluated by cardiac tele attending for elevated troponin, thought to be due to demand ischemia from COVID/ hypotension; no indications for cardiac tele admission. Cardiology consulted for elevated troponin and evaluation for CHF due to elevated proBNP.     SUBJECTIVE / CONSULTANT HPI: Patient seen and examined at bedside. Reports she is feeling fine just tired. Reports today she was walking to the bathroom when she felt lightheaded, dizzy and weak and then fell down on the floor. This has never happened to her before. Denies any LOC and reports she was awake the whole time. She tried to get up but was only able to crawl and was ultimately found down by her daughter. She does not recall how long she was down for. She denies any chest pain or SOB and denies any CP or SOB today during incident. Denies any history of CHF. Says she takes her xarelto every day. Denies any LE edema. Reports she sometimes feels SOB at home b ut does not know how often or how long, unable to provide history surrounding this. She follows with outpatient cardiologist Dr. Allen (she does not recall where he works but per [facility]) and has never been diagnosed with CHF. She does not remember the last echo that was done or when it was done or what the results were. She denies any SOB currently and says her breathing is fine.       PHYSICAL EXAM:    General: lying in bed in NAD   HEENT: abrasions on nose with dried blood  Neck: no JVD   Cardiovascular: +S1/S2, tachy rate, regular rhythm, no murmurs   Respiratory: CTA B/L   Extremities: WWP; no LE edema  Neurological: AAOx3    VITAL SIGNS:  Vital Signs Last 24 Hrs  T(C): 36.7 (17 Jan 2025 00:02), Max: 37.5 (16 Jan 2025 19:28)  T(F): 98 (17 Jan 2025 00:02), Max: 99.5 (16 Jan 2025 19:28)  HR: 76 (17 Jan 2025 00:02) (76 - 109)  BP: 137/85 (17 Jan 2025 00:02) (96/52 - 139/76)  BP(mean): --  RR: 20 (17 Jan 2025 00:02) (18 - 20)  SpO2: 98% (17 Jan 2025 00:02) (95% - 99%)    Parameters below as of 17 Jan 2025 00:02  Patient On (Oxygen Delivery Method): nasal cannula  O2 Flow (L/min): 2        MEDICATIONS:  MEDICATIONS  (STANDING):  influenza  Vaccine (HIGH DOSE) 0.5 milliLiter(s) IntraMuscular once  levothyroxine 25 MICROGram(s) Oral daily  metoprolol succinate  milliGRAM(s) Oral every 24 hours  predniSONE   Tablet 5 milliGRAM(s) Oral daily  remdesivir  IVPB 200 milliGRAM(s) IV Intermittent every 24 hours  remdesivir  IVPB   IV Intermittent     MEDICATIONS  (PRN):  acetaminophen     Tablet .. 650 milliGRAM(s) Oral every 6 hours PRN Temp greater or equal to 38C (100.4F), Mild Pain (1 - 3)  aluminum hydroxide/magnesium hydroxide/simethicone Suspension 30 milliLiter(s) Oral every 4 hours PRN Dyspepsia  melatonin 3 milliGRAM(s) Oral at bedtime PRN Insomnia      ALLERGIES:  Allergies    penicillin (Unknown)    Intolerances        LABS:                        10.1   11.56 )-----------( 191      ( 16 Jan 2025 15:17 )             30.9     01-16    138  |  96  |  22  ----------------------------<  133[H]  4.1   |  25  |  1.25    Ca    9.2      16 Jan 2025 15:17  Phos  4.3     01-16  Mg     1.5     01-16    TPro  6.9  /  Alb  3.9  /  TBili  0.5  /  DBili  x   /  AST  33  /  ALT  13  /  AlkPhos  50  01-16    PT/INR - ( 16 Jan 2025 15:17 )   PT: 14.6 sec;   INR: 1.27          PTT - ( 16 Jan 2025 15:17 )  PTT:33.9 sec  Urinalysis Basic - ( 16 Jan 2025 22:27 )    Color: Yellow / Appearance: Clear / SG: >1.030 / pH: x  Gluc: x / Ketone: Negative mg/dL  / Bili: Negative / Urobili: 0.2 mg/dL   Blood: x / Protein: 30 mg/dL / Nitrite: Negative   Leuk Esterase: Negative / RBC: 1 /HPF / WBC 0 /HPF   Sq Epi: x / Non Sq Epi: 0 /HPF / Bacteria: Negative /HPF      CAPILLARY BLOOD GLUCOSE          RADIOLOGY & ADDITIONAL TESTS: Reviewed.       HPI: 89F PMH HTN, HLD, afib on xarelto, hypothyroidism, CVA, polymyalgia rheumatica, and SCC of tongue (no hx of chemo), who presents for unwitnessed fall with head strike. Per daughter at bedside, mom was last seen by friends last night in usual state of health. This morning daughter called patient not answering. Other daughter then went home found patient in bed, appeared drowsy, was noted to be incontinent of stool  and called EMS. Upon arrival of EMS: She was found hypotensive, tachycardic, with fever received 500cc of NS and Tylenol for fever. As per daughter, pt had been coughing x few days. Upon arrival to ED: after IV fluids pt much improved, able to recall walking to bathroom, feeling weak and falling, hitting face on ground. Denies LOC.  Per EMS patient initially hypotensive and altered which improved with IVF resuscitation. CT head negative for acute bleed, CT chest largely unremarkable, patient found to be COVID positive. She received 3L NS and admitted to [facility] for management of fall and COVID. Pt evaluated by cardiac tele attending for elevated troponin, thought to be due to demand ischemia from COVID/ hypotension; no indications for cardiac tele admission. Cardiology consulted for elevated troponin and evaluation for CHF due to elevated proBNP.     SUBJECTIVE / CONSULTANT HPI: Patient seen and examined at bedside. Reports she is feeling fine just tired. Reports today she was walking to the bathroom when she felt lightheaded, dizzy and weak and then fell down on the floor. This has never happened to her before. Denies any LOC and reports she was awake the whole time. She tried to get up but was only able to crawl and was ultimately found down by her daughter. She does not recall how long she was down for. She denies any chest pain or SOB and denies any CP or SOB today during incident. Denies any history of CHF. Says she takes her xarelto every day. Denies any LE edema. Reports she sometimes feels SOB at home b ut does not know how often or how long, unable to provide history surrounding this. She follows with outpatient cardiologist Dr. Allen (she does not recall where he works but per [facility]) and has never been diagnosed with CHF. She does not remember the last echo that was done or when it was done or what the results were. She denies any SOB currently and says her breathing is fine.       PHYSICAL EXAM:    General: lying in bed in NAD   HEENT: abrasions on nose with dried blood  Neck: 8 cm JVD    Cardiovascular: +S1/S2, tachy rate, regular rhythm, no murmurs   Respiratory: CTA B/L   Extremities: WWP; no LE edema  Neurological: AAOx3    VITAL SIGNS:  Vital Signs Last 24 Hrs  T(C): 36.7 (17 Jan 2025 00:02), Max: 37.5 (16 Jan 2025 19:28)  T(F): 98 (17 Jan 2025 00:02), Max: 99.5 (16 Jan 2025 19:28)  HR: 76 (17 Jan 2025 00:02) (76 - 109)  BP: 137/85 (17 Jan 2025 00:02) (96/52 - 139/76)  BP(mean): --  RR: 20 (17 Jan 2025 00:02) (18 - 20)  SpO2: 98% (17 Jan 2025 00:02) (95% - 99%)    Parameters below as of 17 Jan 2025 00:02  Patient On (Oxygen Delivery Method): nasal cannula  O2 Flow (L/min): 2        MEDICATIONS:  MEDICATIONS  (STANDING):  influenza  Vaccine (HIGH DOSE) 0.5 milliLiter(s) IntraMuscular once  levothyroxine 25 MICROGram(s) Oral daily  metoprolol succinate  milliGRAM(s) Oral every 24 hours  predniSONE   Tablet 5 milliGRAM(s) Oral daily  remdesivir  IVPB 200 milliGRAM(s) IV Intermittent every 24 hours  remdesivir  IVPB   IV Intermittent     MEDICATIONS  (PRN):  acetaminophen     Tablet .. 650 milliGRAM(s) Oral every 6 hours PRN Temp greater or equal to 38C (100.4F), Mild Pain (1 - 3)  aluminum hydroxide/magnesium hydroxide/simethicone Suspension 30 milliLiter(s) Oral every 4 hours PRN Dyspepsia  melatonin 3 milliGRAM(s) Oral at bedtime PRN Insomnia      ALLERGIES:  Allergies    penicillin (Unknown)    Intolerances        LABS:                        10.1   11.56 )-----------( 191      ( 16 Jan 2025 15:17 )             30.9     01-16    138  |  96  |  22  ----------------------------<  133[H]  4.1   |  25  |  1.25    Ca    9.2      16 Jan 2025 15:17  Phos  4.3     01-16  Mg     1.5     01-16    TPro  6.9  /  Alb  3.9  /  TBili  0.5  /  DBili  x   /  AST  33  /  ALT  13  /  AlkPhos  50  01-16    PT/INR - ( 16 Jan 2025 15:17 )   PT: 14.6 sec;   INR: 1.27          PTT - ( 16 Jan 2025 15:17 )  PTT:33.9 sec  Urinalysis Basic - ( 16 Jan 2025 22:27 )    Color: Yellow / Appearance: Clear / SG: >1.030 / pH: x  Gluc: x / Ketone: Negative mg/dL  / Bili: Negative / Urobili: 0.2 mg/dL   Blood: x / Protein: 30 mg/dL / Nitrite: Negative   Leuk Esterase: Negative / RBC: 1 /HPF / WBC 0 /HPF   Sq Epi: x / Non Sq Epi: 0 /HPF / Bacteria: Negative /HPF      CAPILLARY BLOOD GLUCOSE          RADIOLOGY & ADDITIONAL TESTS: Reviewed.

## 2025-01-17 NOTE — PHYSICAL THERAPY INITIAL EVALUATION ADULT - ADDITIONAL COMMENTS
Pt lives alone in an apt with elevator, denies stairs. Prior to admission, pt ambulated with SC. Pt has a bath tub. Pt has a private hire aide 5 days  7 hours.

## 2025-01-17 NOTE — PHYSICAL THERAPY INITIAL EVALUATION ADULT - IMPAIRMENTS CONTRIBUTING TO GAIT DEVIATIONS, PT EVAL
Pt unable to ambulate further distance secondary pt complains feeling lightheadedness and request to sit down(vital sign documented in flow sheet)/impaired balance/impaired postural control/decreased strength

## 2025-01-17 NOTE — PHYSICAL THERAPY INITIAL EVALUATION ADULT - PERTINENT HX OF CURRENT PROBLEM, REHAB EVAL
Pt is an 88 yo female with  PMHx of significant for Xarelto, on Eliquis, polymyalgia rheumatica, L BCA, and SCC of tongue (no hx of chemo), who presents for unwitnessed fall found to have hypotension initially, with COVID PNA, admitted for further work up of fall and treatment of COVID

## 2025-01-17 NOTE — PROGRESS NOTE ADULT - SUBJECTIVE AND OBJECTIVE BOX
Pt seen and examined   alert  oriented (has history of cognitive decline)  denies sob, CP    REVIEW OF SYSTEMS:  Constitutional: No fever, weight loss or fatigue  Resp: deneis sob  Cardiovascular: No chest pain, palpitations, dizziness    Gastrointestinal: No abdominal or epigastric pain. No nausea, vomiting  ; No diarrhea    Skin: No itching, burning, rashes or lesions       MEDICATIONS:  MEDICATIONS  (STANDING):  influenza  Vaccine (HIGH DOSE) 0.5 milliLiter(s) IntraMuscular once  levothyroxine 25 MICROGram(s) Oral daily  metoprolol succinate  milliGRAM(s) Oral every 24 hours  predniSONE   Tablet 5 milliGRAM(s) Oral daily  remdesivir  IVPB   IV Intermittent     MEDICATIONS  (PRN):  acetaminophen     Tablet .. 650 milliGRAM(s) Oral every 6 hours PRN Temp greater or equal to 38C (100.4F), Mild Pain (1 - 3)  aluminum hydroxide/magnesium hydroxide/simethicone Suspension 30 milliLiter(s) Oral every 4 hours PRN Dyspepsia  melatonin 3 milliGRAM(s) Oral at bedtime PRN Insomnia      Allergies    penicillin (Unknown)    Intolerances        Vital Signs Last 24 Hrs  T(C): 36.9 (17 Jan 2025 06:06), Max: 37.5 (16 Jan 2025 19:28)  T(F): 98.4 (17 Jan 2025 06:06), Max: 99.5 (16 Jan 2025 19:28)  HR: 97 (17 Jan 2025 07:03) (76 - 109)  BP: 117/70 (17 Jan 2025 07:03) (96/52 - 139/76)  BP(mean): 86 (17 Jan 2025 07:03) (70 - 86)  RR: 18 (17 Jan 2025 06:06) (18 - 20)  SpO2: 94% (17 Jan 2025 06:06) (94% - 99%)    Parameters below as of 17 Jan 2025 06:06  Patient On (Oxygen Delivery Method): nasal cannula          PHYSICAL EXAM:    General:   in no acute distress  HEENT: MMM, conjunctiva and sclera clear, traumatic injury Gabella  Lungs: clear  Heart: regular  Gastrointestinal: Soft non-tender non-distended; Normal bowel sounds;    Skin: Warm and dry. No obvious rash    LABS:      CBC Full  -  ( 17 Jan 2025 05:30 )  WBC Count : 8.31 K/uL  RBC Count : 3.10 M/uL  Hemoglobin : 9.2 g/dL  Hematocrit : 29.3 %  Platelet Count - Automated : 157 K/uL  Mean Cell Volume : 94.5 fl  Mean Cell Hemoglobin : 29.7 pg  Mean Cell Hemoglobin Concentration : 31.4 g/dL  Auto Neutrophil # : 6.96 K/uL  Auto Lymphocyte # : 0.65 K/uL  Auto Monocyte # : 0.61 K/uL  Auto Eosinophil # : 0.01 K/uL  Auto Basophil # : 0.03 K/uL  Auto Neutrophil % : 83.8 %  Auto Lymphocyte % : 7.8 %  Auto Monocyte % : 7.3 %  Auto Eosinophil % : 0.1 %  Auto Basophil % : 0.4 %    01-17    137  |  103  |  15  ----------------------------<  102[H]  4.5   |  20[L]  |  1.18    Ca    8.7      17 Jan 2025 05:30  Phos  4.2     01-17  Mg     2.3     01-17    TPro  6.3  /  Alb  3.4  /  TBili  0.4  /  DBili  x   /  AST  51[H]  /  ALT  13  /  AlkPhos  43  01-17    PT/INR - ( 16 Jan 2025 15:17 )   PT: 14.6 sec;   INR: 1.27          PTT - ( 16 Jan 2025 15:17 )  PTT:33.9 sec      Urinalysis Basic - ( 17 Jan 2025 05:30 )    Color: x / Appearance: x / SG: x / pH: x  Gluc: 102 mg/dL / Ketone: x  / Bili: x / Urobili: x   Blood: x / Protein: x / Nitrite: x   Leuk Esterase: x / RBC: x / WBC x   Sq Epi: x / Non Sq Epi: x / Bacteria: x                RADIOLOGY & ADDITIONAL STUDIES (The following images were personally reviewed):

## 2025-01-17 NOTE — CONSULT NOTE ADULT - ASSESSMENT
89F PMH HTN, HLD, afib on xarelto, hypothyroidism, CVA, polymyalgia rheumatica, and SCC of tongue (no hx of chemo), who presents for unwitnessed fall, found to be septic 2/2 COVID infection, admitted to [facility] for management of fall and COVID. Cardiology consulted for elevated troponin and evaluation for CHF due to elevated proBNP.     Review of studies:  ECG 1/16/25: sinus rhythm, RBBB    Home meds (cardiac): lisinopril 20mg qd, crestor 10mg qd, xarelto 15mg qd, toprol 100mg qd    Outpatient cardiologist: Dr. Allen [facility]     Recommendations:    #elevated troponin   -patient with troponin elevation to 373-->307 on admission   -denies any chest pain or SOB; also denies any CP on exertion   -ECG non-ischemic   -likely demand ischemia in setting of sepsis/hypotension   -troponin downtrending; no need to continue trending   -per Dr. Hernandez's documentation patient for stress test next week in preparation for joint replacement at [facility] ; unclear previous ischemic workup done   -please obtain records/collateral from outpatient cardiologist Dr. Allen regarding any previous ischemic workup     #concern for CHF    #pAfib   -history of afib, unclear if patient has had DCCV in the past? patient herself denies however EP documentation from 2022 patient presented for elective cardioversion   -currently in sinus rhythm  -c/w home toprol (hold for SBP <100, HR <60)   -CHADSVASC at least 6, patient high risk for stroke   -c/w home xarelto  89F PMH HTN, HLD, afib on xarelto, hypothyroidism, CVA, polymyalgia rheumatica, and SCC of tongue (no hx of chemo), who presents for unwitnessed fall, found to be septic 2/2 COVID infection, admitted to [facility] for management of fall and COVID. Cardiology consulted for elevated troponin and evaluation for CHF due to elevated proBNP.     Review of studies:  ECG 1/16/25: sinus rhythm, RBBB    Home meds (cardiac): lisinopril 20mg qd, crestor 10mg qd, xarelto 15mg qd, toprol 100mg qd    Outpatient cardiologist: Dr. Allen [facility]     Recommendations:    #elevated troponin   -patient with troponin elevation to 373-->307 on admission   -denies any chest pain or SOB; also denies any CP on exertion   -ECG non-ischemic   -likely demand ischemia in setting of sepsis/hypotension   -low concern for active ACS   -troponin downtrending; no need to continue trending   -per Dr. Hernandez's documentation patient for stress test next week in preparation for joint replacement at [facility] ; unclear previous ischemic workup done   -please obtain records/collateral from outpatient cardiologist Dr. Allen regarding any previous ischemic workup     #concern for CHF  -    #pAfib   -history of afib, unclear if patient has had DCCV in the past? patient herself denies however EP documentation from 2022 patient presented for elective cardioversion   -currently in sinus rhythm  -c/w home toprol (hold for SBP <100, HR <60)   -CHADSVASC at least 6, patient high risk for stroke   -c/w home xarelto  89F PMH HTN, HLD, afib on xarelto, hypothyroidism, CVA, polymyalgia rheumatica, and SCC of tongue (no hx of chemo), who presents for unwitnessed fall, found to be septic 2/2 COVID infection, admitted to [facility] for management of fall and COVID. Cardiology consulted for elevated troponin and evaluation for CHF due to elevated proBNP.     Review of studies:  ECG 1/16/25: sinus rhythm, RBBB    Home meds (cardiac): lisinopril 20mg qd, crestor 10mg qd, xarelto 15mg qd, toprol 100mg qd    Outpatient cardiologist: Dr. Allen [facility]     Recommendations:    #elevated troponin   -patient with troponin elevation to 373-->307 on admission   -denies any chest pain or SOB; also denies any CP on exertion   -ECG non-ischemic   -likely demand ischemia in setting of sepsis/hypotension   -low concern for active ACS   -troponin downtrending; no need to continue trending   -per Dr. Hernandez's documentation patient for stress test next week in preparation for joint replacement at [facility] ; unclear previous ischemic workup done   -please obtain records/collateral from outpatient cardiologist Dr. Allen regarding any previous ischemic workup     #concern for CHF  -patient without history of CHF however proBNP elevated to 19K on admission  -on exam patient warm/well-perfused and euvolemic appearing; no signs of decompensated HF or volume overload   -CT chest without pulm edema or effusions however did show cardiomegaly and mitral annular calcifications  -UA with very high spec gravity pointing towards intravascularly depleted/dry   -s/p 3L IVF on admission with rapid clinical improvement and lactate cleared   -obtain TTE   -would hold off on any diuresis for now   -strict I&O's; monitor UOP   -please obtain records/collateral from outpatient cardiologist at [place] regarding any previous echos done     #pAfib   -history of afib, unclear if patient has had DCCV in the past? patient herself denies however EP documentation from 2022 patient presented for elective cardioversion   -currently in sinus rhythm  -c/w home toprol (hold for SBP <100, HR <60)   -CHADSVASC at least 6, patient high risk for stroke   -c/w home xarelto     Preliminary recommendations pending discussion with attending cardiologist   Recommendations not final until attested by attending  89F PMH HTN, HLD, afib on xarelto, hypothyroidism, CVA, polymyalgia rheumatica, and SCC of tongue (no hx of chemo), who presents for unwitnessed fall, found to be septic 2/2 COVID infection, admitted to [facility] for management of fall and COVID. Cardiology consulted for elevated troponin and evaluation for CHF due to elevated proBNP.     Review of studies:  ECG 1/16/25: sinus rhythm, RBBB    Home meds (cardiac): lisinopril 20mg qd, crestor 10mg qd, xarelto 15mg qd, toprol 100mg qd    Outpatient cardiologist: Dr. Allen [facility]     Recommendations:    #elevated troponin   -patient with troponin elevation to 373-->307 on admission   -denies any chest pain or SOB; also denies any CP on exertion   -ECG non-ischemic   -likely demand ischemia in setting of sepsis/hypotension   -low concern for active ACS   -troponin downtrending; no need to continue trending   -per Dr. Hernandez's documentation patient for stress test next week in preparation for joint replacement at [facility] ; unclear previous ischemic workup done   -please obtain records/collateral from outpatient cardiologist Dr. Allen regarding any previous ischemic workup     #concern for CHF  -patient without history of CHF however proBNP elevated to 19K on admission  -on exam patient warm/well-perfused and euvolemic appearing; no signs of decompensated HF or volume overload   -CT chest without pulm edema or effusions however did show cardiomegaly and mitral annular calcifications  -UA with very high spec gravity pointing towards intravascularly depleted/dry   -s/p 3L IVF on admission with rapid clinical improvement and lactate cleared   -obtain TTE   -would hold off on any diuresis for now   -strict I&O's; monitor UOP   -please obtain records/collateral from outpatient cardiologist at [place] regarding any previous echos done     #pAfib   -history of afib, unclear if patient has had DCCV in the past? patient herself denies however EP documentation from 2022 patient presented for elective cardioversion   -currently in sinus rhythm  -c/w home toprol (hold for SBP <100, HR <60)   -CHADSVASC at least 6, patient high risk for stroke   -c/w home xarelto       # Addendum (1/17 recs):  - Please give 20mg IV Lasix today, the patient has a significant JVP, will reassess the need tomorrow to give extra 20mg oral Lasix  - Please obtain orthostatics  - Can change Toprol to 50 BID with orthostatics before giving the dose  - Please obtain collateral from outpatient cardiologist Dr Allen about cardiac history, the patient claims she had an ECHO last week. She was also due to nuclear stress test pre operatively next week.     Preliminary recommendations pending discussion with attending cardiologist   Recommendations not final until attested by attending  89F PMH HTN, HLD, afib on xarelto, hypothyroidism, CVA, polymyalgia rheumatica, and SCC of tongue (no hx of chemo), who presents for unwitnessed fall, found to be septic 2/2 COVID infection, admitted to [facility] for management of fall and COVID. Cardiology consulted for elevated troponin and evaluation for CHF due to elevated proBNP.     Review of studies:  ECG 1/16/25: sinus rhythm, RBBB    Home meds (cardiac): lisinopril 20mg qd, crestor 10mg qd, xarelto 15mg qd, toprol 100mg qd    Outpatient cardiologist: Dr. Allen [facility]     Recommendations:    #elevated troponin   -patient with troponin elevation to 373-->307 on admission   -denies any chest pain or SOB; also denies any CP on exertion   -ECG non-ischemic   -likely demand ischemia in setting of sepsis/hypotension   -low concern for active ACS   -troponin downtrending; no need to continue trending   -per Dr. Hernandez's documentation patient for stress test next week in preparation for joint replacement at [facility] ; unclear previous ischemic workup done   -please obtain records/collateral from outpatient cardiologist Dr. Allen regarding any previous ischemic workup     #concern for CHF  -patient without history of CHF however proBNP elevated to 19K on admission  -on exam patient warm/well-perfused and euvolemic appearing; no signs of decompensated HF or volume overload   -CT chest without pulm edema or effusions however did show cardiomegaly and mitral annular calcifications  -UA with very high spec gravity pointing towards intravascularly depleted/dry   -s/p 3L IVF on admission with rapid clinical improvement and lactate cleared   -obtain TTE   -would hold off on any diuresis for now   -strict I&O's; monitor UOP   -please obtain records/collateral from outpatient cardiologist at [place] regarding any previous echos done     #pAfib   -history of afib, unclear if patient has had DCCV in the past? patient herself denies however EP documentation from 2022 patient presented for elective cardioversion   -currently in sinus rhythm  -c/w home toprol (hold for SBP <100, HR <60)   -CHADSVASC at least 6, patient high risk for stroke   -c/w home xarelto       Addendum (1/17 recs):  - per primary team pt was orthostatic today  - although pt hypervolemic on exam, would hold off IV diuretics  - would also discontinue Toprol XL until pt is not orthostatic - when she is no longer orthostatic, would resume Toprol XL at 50mg BID  - would place compression stockings and encourage PO intake   - Please obtain collateral from outpatient cardiologist Dr Allen about cardiac history, the patient claims she had an ECHO last week. If we obtain outpatient TTE, may not need to repeat. She was also due to get a nuclear stress test pre operatively next week.       Preliminary recommendations pending discussion with attending cardiologist   Recommendations not final until attested by attending

## 2025-01-18 RX ORDER — METOPROLOL SUCCINATE 25 MG
12.5 TABLET, EXTENDED RELEASE 24 HR ORAL EVERY 12 HOURS
Refills: 0 | Status: DISCONTINUED | OUTPATIENT
Start: 2025-01-18 | End: 2025-01-18

## 2025-01-18 RX ADMIN — REMDESIVIR 200 MILLIGRAM(S): 100 INJECTION, POWDER, LYOPHILIZED, FOR SOLUTION INTRAVENOUS at 06:48

## 2025-01-18 RX ADMIN — ACETAMINOPHEN 650 MILLIGRAM(S): 160 SUSPENSION ORAL at 15:26

## 2025-01-18 RX ADMIN — PREDNISONE 5 MILLIGRAM(S): 5 TABLET ORAL at 06:48

## 2025-01-18 RX ADMIN — RIVAROXABAN 15 MILLIGRAM(S): 20 TABLET, FILM COATED ORAL at 19:22

## 2025-01-18 RX ADMIN — LEVOTHYROXINE SODIUM 25 MICROGRAM(S): 25 TABLET ORAL at 06:48

## 2025-01-18 NOTE — PROGRESS NOTE ADULT - SUBJECTIVE AND OBJECTIVE BOX
Patient is a 89y old  Female who presents with a chief complaint of Fall (18 Jan 2025 13:05)    OVERNIGHT EVENTS: on: had nurse wrap legs in ace bandages/compression stockings    SUBJECTIVE:. patient exam at the bedside this morning that she has no nausea and vomiting or shortness of breath and cough. Have gotten better done overnight no chest pain no palpitations state she’s feeling better nasal cannula not appropriately in her nose however her most recent oxygen saturation 95% continues to have abdominal pain.  Increase urinary output       ROS: otherwise negative      T(C): 36.7 (01-18-25 @ 20:35), Max: 37.5 (01-18-25 @ 16:38)  HR: 86 (01-18-25 @ 20:35) (80 - 86)  BP: 104/60 (01-18-25 @ 20:35) (98/65 - 117/73)  RR: 18 (01-18-25 @ 20:35) (17 - 20)  SpO2: 93% (01-18-25 @ 20:35) (93% - 97%)  Wt(kg): --Vital Signs Last 24 Hrs  T(C): 36.7 (18 Jan 2025 20:35), Max: 37.5 (18 Jan 2025 16:38)  T(F): 98 (18 Jan 2025 20:35), Max: 99.5 (18 Jan 2025 16:38)  HR: 86 (18 Jan 2025 20:35) (80 - 86)  BP: 104/60 (18 Jan 2025 20:35) (98/65 - 117/73)  BP(mean): 76 (18 Jan 2025 13:38) (76 - 86)  RR: 18 (18 Jan 2025 20:35) (17 - 20)  SpO2: 93% (18 Jan 2025 20:35) (93% - 97%)    Parameters below as of 18 Jan 2025 20:35  Patient On (Oxygen Delivery Method): room air        PHYSICAL EXAM:  Constitutional: Resting comfortably in bed; NAD  Head: notable laceration on forehead   Eyes: PERRL, EOMI, clear conjunctiva  ENT: no nasal discharge; dry mucous membranes  Neck: supple;   Respiratory: CTA B/L; no W/R/R, no retractions  Cardiac: +S1/S2; RRR; no M/R/G;  Gastrointestinal: soft, NT/ND; no rebound or guarding; +BSx4  Extremities: WWP, no clubbing or cyanosis; no peripheral edema  Musculoskeletal:; no joint swelling, tenderness or erythema  Vascular: 2+ radial, femoral, DP/PT pulses B/L  Dermatologic: skin warm, dry and intact; no rashes, wounds, or scars  Neurologic: AAOx2 (person and place, not time); strength 4/5 in UE and LE bilaterally  Psychiatric: affect and characteristics of appearance, verbalizations, behaviors are appropriate      LABS:                        9.2    8.31  )-----------( 157      ( 17 Jan 2025 05:30 )             29.3     01-17    137  |  103  |  15  ----------------------------<  102[H]  4.5   |  20[L]  |  1.18    Ca    8.7      17 Jan 2025 05:30  Phos  4.2     01-17  Mg     2.3     01-17    TPro  6.3  /  Alb  3.4  /  TBili  0.4  /  DBili  x   /  AST  51[H]  /  ALT  13  /  AlkPhos  43  01-17    Iron 16, TIBC 238, %Sat 7, Ferritin 126/ 01-17-25 @ 05:30      Urinalysis Basic - ( 17 Jan 2025 05:30 )    Color: x / Appearance: x / SG: x / pH: x  Gluc: 102 mg/dL / Ketone: x  / Bili: x / Urobili: x   Blood: x / Protein: x / Nitrite: x   Leuk Esterase: x / RBC: x / WBC x   Sq Epi: x / Non Sq Epi: x / Bacteria: x      CAPILLARY BLOOD GLUCOSE            Urinalysis Basic - ( 17 Jan 2025 05:30 )    Color: x / Appearance: x / SG: x / pH: x  Gluc: 102 mg/dL / Ketone: x  / Bili: x / Urobili: x   Blood: x / Protein: x / Nitrite: x   Leuk Esterase: x / RBC: x / WBC x   Sq Epi: x / Non Sq Epi: x / Bacteria: x        MEDICATIONS  (STANDING):  influenza  Vaccine (HIGH DOSE) 0.5 milliLiter(s) IntraMuscular once  levothyroxine 25 MICROGram(s) Oral daily  metoprolol tartrate 12.5 milliGRAM(s) Oral every 12 hours  predniSONE   Tablet 5 milliGRAM(s) Oral daily  remdesivir  IVPB   IV Intermittent   remdesivir  IVPB 100 milliGRAM(s) IV Intermittent every 24 hours  rivaroxaban 15 milliGRAM(s) Oral with dinner    MEDICATIONS  (PRN):  acetaminophen     Tablet .. 650 milliGRAM(s) Oral every 6 hours PRN Temp greater or equal to 38C (100.4F), Mild Pain (1 - 3)  aluminum hydroxide/magnesium hydroxide/simethicone Suspension 30 milliLiter(s) Oral every 4 hours PRN Dyspepsia  melatonin 3 milliGRAM(s) Oral at bedtime PRN Insomnia      RADIOLOGY & ADDITIONAL TESTS: Reviewed

## 2025-01-18 NOTE — PROGRESS NOTE ADULT - PROBLEM SELECTOR PLAN 1
Presents with unwitnessed fall with head strike, NO LOC. Able to recall walking to bathroom feeling weak and falling. Able to walk back to bed by herself. Found with stool incontinence  No prior falls, no hx of seizure  On physical exam without focal deficit  CT head, maxillofacial, CT chest without acute findings. A tiny hypodense focus peripherally in the spleen, uncertain if this represents a small cyst or a tiny laceration.  ECG: HR 97, QTc 515, RBBB, (not seen in ECG from 2022) no significant ST wave changes  DDX: Likely orthostatic given hypotension vs low concern for seizure (as patient able to recall events) vs cardiogenic (loop recorder currently not working and no recent TTE available) vs vasovagal  Noted to be orthostatic positive form lying to sitting to standing 126/ 82 to 113/70 to 90/49.   TTE: wnl  Plan:   - PT consult  - fall precautions  - Monitor Hgb and VS closely given spleen finding ?cannot exclude hematoma  - Consider EP consult in AM to replace loop recorder

## 2025-01-18 NOTE — PROGRESS NOTE ADULT - PROBLEM SELECTOR PLAN 6
CHADSVASC ~ 6, patient high risk for stroke   Home med per HIE: Metoprolol  qd and xarelto 15 qd    Plan:   - hold metoprolol 12.5mg BID iso of orthostasis  - restarting Xarelto 15mg likely cyst on imaging, stable HGB, VS

## 2025-01-18 NOTE — PROGRESS NOTE ADULT - SUBJECTIVE AND OBJECTIVE BOX
Pt seen and examined   she is asking when she can go home  feels better    REVIEW OF SYSTEMS:  Constitutional: No fever,   Cardiovascular: No chest pain, palpitations,   Resp: no sob  Gastrointestinal: No abdominal or epigastric pain. No nausea, vomiting  No diarrhea   Skin: No itching, burning, rashes or lesions       MEDICATIONS:  MEDICATIONS  (STANDING):  influenza  Vaccine (HIGH DOSE) 0.5 milliLiter(s) IntraMuscular once  levothyroxine 25 MICROGram(s) Oral daily  metoprolol tartrate 12.5 milliGRAM(s) Oral every 12 hours  predniSONE   Tablet 5 milliGRAM(s) Oral daily  remdesivir  IVPB   IV Intermittent   remdesivir  IVPB 100 milliGRAM(s) IV Intermittent every 24 hours  rivaroxaban 15 milliGRAM(s) Oral with dinner    MEDICATIONS  (PRN):  acetaminophen     Tablet .. 650 milliGRAM(s) Oral every 6 hours PRN Temp greater or equal to 38C (100.4F), Mild Pain (1 - 3)  aluminum hydroxide/magnesium hydroxide/simethicone Suspension 30 milliLiter(s) Oral every 4 hours PRN Dyspepsia  melatonin 3 milliGRAM(s) Oral at bedtime PRN Insomnia      Allergies    penicillin (Unknown)    Intolerances        Vital Signs Last 24 Hrs  T(C): 36.9 (18 Jan 2025 05:55), Max: 37 (17 Jan 2025 21:04)  T(F): 98.4 (18 Jan 2025 05:55), Max: 98.6 (17 Jan 2025 21:04)  HR: 80 (18 Jan 2025 05:55) (80 - 88)  BP: 110/67 (18 Jan 2025 05:55) (110/67 - 116/74)  BP(mean): --  RR: 18 (18 Jan 2025 05:55) (18 - 18)  SpO2: 93% (18 Jan 2025 05:55) (93% - 100%)    Parameters below as of 17 Jan 2025 21:04  Patient On (Oxygen Delivery Method): nasal cannula  O2 Flow (L/min): 2      01-17 @ 07:01  -  01-18 @ 07:00  --------------------------------------------------------  IN: 0 mL / OUT: 350 mL / NET: -350 mL        PHYSICAL EXAM:    General:  in no acute distress  HEENT: MMM, conjunctiva and sclera clear  Lungs: clear  Heart: regular  Gastrointestinal: Soft non-tender non-distended; Normal bowel sounds;    Skin: Warm and dry. No obvious rash    LABS:      CBC Full  -  ( 17 Jan 2025 05:30 )  WBC Count : 8.31 K/uL  RBC Count : 3.10 M/uL  Hemoglobin : 9.2 g/dL  Hematocrit : 29.3 %  Platelet Count - Automated : 157 K/uL  Mean Cell Volume : 94.5 fl  Mean Cell Hemoglobin : 29.7 pg  Mean Cell Hemoglobin Concentration : 31.4 g/dL  Auto Neutrophil # : 6.96 K/uL  Auto Lymphocyte # : 0.65 K/uL  Auto Monocyte # : 0.61 K/uL  Auto Eosinophil # : 0.01 K/uL  Auto Basophil # : 0.03 K/uL  Auto Neutrophil % : 83.8 %  Auto Lymphocyte % : 7.8 %  Auto Monocyte % : 7.3 %  Auto Eosinophil % : 0.1 %  Auto Basophil % : 0.4 %    01-17    137  |  103  |  15  ----------------------------<  102[H]  4.5   |  20[L]  |  1.18    Ca    8.7      17 Jan 2025 05:30  Phos  4.2     01-17  Mg     2.3     01-17    TPro  6.3  /  Alb  3.4  /  TBili  0.4  /  DBili  x   /  AST  51[H]  /  ALT  13  /  AlkPhos  43  01-17    PT/INR - ( 16 Jan 2025 15:17 )   PT: 14.6 sec;   INR: 1.27          PTT - ( 16 Jan 2025 15:17 )  PTT:33.9 sec      Urinalysis Basic - ( 17 Jan 2025 05:30 )    Color: x / Appearance: x / SG: x / pH: x  Gluc: 102 mg/dL / Ketone: x  / Bili: x / Urobili: x   Blood: x / Protein: x / Nitrite: x   Leuk Esterase: x / RBC: x / WBC x   Sq Epi: x / Non Sq Epi: x / Bacteria: x                RADIOLOGY & ADDITIONAL STUDIES (The following images were personally reviewed):

## 2025-01-18 NOTE — PROGRESS NOTE ADULT - PROBLEM SELECTOR PLAN 2
COVID + , Reports cough for few days, CXR with vascular congestion otherwise clear lungs    Plan:   - c/w remdesiver for 5 days 1/18 - 1/21  - ESR, CRP daily  - f/u blood culture

## 2025-01-19 LAB
ALBUMIN SERPL ELPH-MCNC: 3.2 G/DL — LOW (ref 3.3–5)
ALP SERPL-CCNC: 43 U/L — SIGNIFICANT CHANGE UP (ref 40–120)
ALT FLD-CCNC: 16 U/L — SIGNIFICANT CHANGE UP (ref 10–45)
ANION GAP SERPL CALC-SCNC: 12 MMOL/L — SIGNIFICANT CHANGE UP (ref 5–17)
AST SERPL-CCNC: 46 U/L — HIGH (ref 10–40)
BASOPHILS # BLD AUTO: 0.03 K/UL — SIGNIFICANT CHANGE UP (ref 0–0.2)
BASOPHILS NFR BLD AUTO: 0.5 % — SIGNIFICANT CHANGE UP (ref 0–2)
BILIRUB SERPL-MCNC: 0.3 MG/DL — SIGNIFICANT CHANGE UP (ref 0.2–1.2)
BUN SERPL-MCNC: 22 MG/DL — SIGNIFICANT CHANGE UP (ref 7–23)
CALCIUM SERPL-MCNC: 9.1 MG/DL — SIGNIFICANT CHANGE UP (ref 8.4–10.5)
CHLORIDE SERPL-SCNC: 102 MMOL/L — SIGNIFICANT CHANGE UP (ref 96–108)
CO2 SERPL-SCNC: 23 MMOL/L — SIGNIFICANT CHANGE UP (ref 22–31)
CREAT SERPL-MCNC: 1.01 MG/DL — SIGNIFICANT CHANGE UP (ref 0.5–1.3)
CRP SERPL-MCNC: 75.6 MG/L — HIGH (ref 0–4)
EGFR: 53 ML/MIN/1.73M2 — LOW
EOSINOPHIL # BLD AUTO: 0.07 K/UL — SIGNIFICANT CHANGE UP (ref 0–0.5)
EOSINOPHIL NFR BLD AUTO: 1.2 % — SIGNIFICANT CHANGE UP (ref 0–6)
ERYTHROCYTE [SEDIMENTATION RATE] IN BLOOD: 53 MM/HR — HIGH
GLUCOSE SERPL-MCNC: 96 MG/DL — SIGNIFICANT CHANGE UP (ref 70–99)
HCT VFR BLD CALC: 32.6 % — LOW (ref 34.5–45)
HGB BLD-MCNC: 10.7 G/DL — LOW (ref 11.5–15.5)
IMM GRANULOCYTES NFR BLD AUTO: 0.3 % — SIGNIFICANT CHANGE UP (ref 0–0.9)
LYMPHOCYTES # BLD AUTO: 1.31 K/UL — SIGNIFICANT CHANGE UP (ref 1–3.3)
LYMPHOCYTES # BLD AUTO: 21.8 % — SIGNIFICANT CHANGE UP (ref 13–44)
MAGNESIUM SERPL-MCNC: 1.9 MG/DL — SIGNIFICANT CHANGE UP (ref 1.6–2.6)
MCHC RBC-ENTMCNC: 29.5 PG — SIGNIFICANT CHANGE UP (ref 27–34)
MCHC RBC-ENTMCNC: 32.8 G/DL — SIGNIFICANT CHANGE UP (ref 32–36)
MCV RBC AUTO: 89.8 FL — SIGNIFICANT CHANGE UP (ref 80–100)
MONOCYTES # BLD AUTO: 0.44 K/UL — SIGNIFICANT CHANGE UP (ref 0–0.9)
MONOCYTES NFR BLD AUTO: 7.3 % — SIGNIFICANT CHANGE UP (ref 2–14)
NEUTROPHILS # BLD AUTO: 4.15 K/UL — SIGNIFICANT CHANGE UP (ref 1.8–7.4)
NEUTROPHILS NFR BLD AUTO: 68.9 % — SIGNIFICANT CHANGE UP (ref 43–77)
NRBC # BLD: 0 /100 WBCS — SIGNIFICANT CHANGE UP (ref 0–0)
NRBC BLD-RTO: 0 /100 WBCS — SIGNIFICANT CHANGE UP (ref 0–0)
PHOSPHATE SERPL-MCNC: 4 MG/DL — SIGNIFICANT CHANGE UP (ref 2.5–4.5)
PLATELET # BLD AUTO: 169 K/UL — SIGNIFICANT CHANGE UP (ref 150–400)
POTASSIUM SERPL-MCNC: 4.1 MMOL/L — SIGNIFICANT CHANGE UP (ref 3.5–5.3)
POTASSIUM SERPL-SCNC: 4.1 MMOL/L — SIGNIFICANT CHANGE UP (ref 3.5–5.3)
PROT SERPL-MCNC: 6.3 G/DL — SIGNIFICANT CHANGE UP (ref 6–8.3)
RBC # BLD: 3.63 M/UL — LOW (ref 3.8–5.2)
RBC # FLD: 14.9 % — HIGH (ref 10.3–14.5)
SODIUM SERPL-SCNC: 137 MMOL/L — SIGNIFICANT CHANGE UP (ref 135–145)
WBC # BLD: 6.02 K/UL — SIGNIFICANT CHANGE UP (ref 3.8–10.5)
WBC # FLD AUTO: 6.02 K/UL — SIGNIFICANT CHANGE UP (ref 3.8–10.5)

## 2025-01-19 RX ORDER — VALSARTAN 80 MG
0 TABLET ORAL
Refills: 0 | DISCHARGE

## 2025-01-19 RX ORDER — METOPROLOL SUCCINATE 25 MG
12.5 TABLET, EXTENDED RELEASE 24 HR ORAL EVERY 12 HOURS
Refills: 0 | Status: DISCONTINUED | OUTPATIENT
Start: 2025-01-19 | End: 2025-01-20

## 2025-01-19 RX ADMIN — ACETAMINOPHEN 650 MILLIGRAM(S): 160 SUSPENSION ORAL at 10:33

## 2025-01-19 RX ADMIN — RIVAROXABAN 15 MILLIGRAM(S): 20 TABLET, FILM COATED ORAL at 18:35

## 2025-01-19 RX ADMIN — REMDESIVIR 200 MILLIGRAM(S): 100 INJECTION, POWDER, LYOPHILIZED, FOR SOLUTION INTRAVENOUS at 06:26

## 2025-01-19 RX ADMIN — ACETAMINOPHEN 650 MILLIGRAM(S): 160 SUSPENSION ORAL at 11:00

## 2025-01-19 RX ADMIN — LEVOTHYROXINE SODIUM 25 MICROGRAM(S): 25 TABLET ORAL at 06:25

## 2025-01-19 RX ADMIN — PREDNISONE 5 MILLIGRAM(S): 5 TABLET ORAL at 06:26

## 2025-01-19 RX ADMIN — ACETAMINOPHEN 650 MILLIGRAM(S): 160 SUSPENSION ORAL at 18:36

## 2025-01-19 RX ADMIN — Medication 12.5 MILLIGRAM(S): at 18:24

## 2025-01-19 RX ADMIN — ACETAMINOPHEN 650 MILLIGRAM(S): 160 SUSPENSION ORAL at 19:12

## 2025-01-19 NOTE — DISCHARGE NOTE PROVIDER - NSDCFUADDAPPT_GEN_ALL_CORE_FT
f/u PCP - 2/6/25 at 1:00 PM with Maikol Hernandez MD ([address], [address], [place] [address])    f/u Cardiology - 2/3/25 at 11:00 AM with Rik Allen MD ([address], [place] [address])   - need appt with Dr. Allen [facility] [phone] f/u PCP - 2/6/25 at 1:00 PM with Maikol Hernandez MD ([address], [address], [place] [address])    Please reach out to Cardiology - Dr. Fox Santa MD ([phone])  regarding a follow up appointment in 1-2 weeks

## 2025-01-19 NOTE — OCCUPATIONAL THERAPY INITIAL EVALUATION ADULT - ADDITIONAL COMMENTS
PTA, pt able to perform all mobility/ADLs independently. Pt required assist from HHA (5 days, 7 hours) for IADLs. Pt ambulated with SC, and has shower chair/grab bars inside tub. Pt's HHA will be staying with pt 24/7 upon discharge from hospital, to assist with all ADLs/mobility, as needed.

## 2025-01-19 NOTE — PROGRESS NOTE ADULT - SUBJECTIVE AND OBJECTIVE BOX
Pt seen and examined   no complaints  feels her normal  walked with PT  joint pain from DJD    REVIEW OF SYSTEMS:  Constitutional: No fever,    Cardiovascular: No chest pain, palpitations,  Resp: no sob  Gastrointestinal: No abdominal or epigastric pain. No nausea, vomiting  ; No diarrhea    Skin: No itching, burning, rashes , nose wound crusted    MEDICATIONS:  MEDICATIONS  (STANDING):  influenza  Vaccine (HIGH DOSE) 0.5 milliLiter(s) IntraMuscular once  levothyroxine 25 MICROGram(s) Oral daily  metoprolol tartrate 12.5 milliGRAM(s) Oral every 12 hours  predniSONE   Tablet 5 milliGRAM(s) Oral daily  remdesivir  IVPB   IV Intermittent   remdesivir  IVPB 100 milliGRAM(s) IV Intermittent every 24 hours  rivaroxaban 15 milliGRAM(s) Oral with dinner    MEDICATIONS  (PRN):  acetaminophen     Tablet .. 650 milliGRAM(s) Oral every 6 hours PRN Temp greater or equal to 38C (100.4F), Mild Pain (1 - 3)  aluminum hydroxide/magnesium hydroxide/simethicone Suspension 30 milliLiter(s) Oral every 4 hours PRN Dyspepsia  melatonin 3 milliGRAM(s) Oral at bedtime PRN Insomnia      Allergies    penicillin (Unknown)    Intolerances        Vital Signs Last 24 Hrs  T(C): 36.4 (19 Jan 2025 06:09), Max: 37.5 (18 Jan 2025 16:38)  T(F): 97.5 (19 Jan 2025 06:09), Max: 99.5 (18 Jan 2025 16:38)  HR: 105 (19 Jan 2025 11:58) (83 - 105)  BP: 157/90 (19 Jan 2025 11:58) (104/60 - 161/113)  BP(mean): 91 (19 Jan 2025 06:09) (91 - 91)  RR: 18 (19 Jan 2025 06:09) (18 - 20)  SpO2: 97% (19 Jan 2025 11:58) (93% - 98%)    Parameters below as of 19 Jan 2025 11:58  Patient On (Oxygen Delivery Method): room air          PHYSICAL EXAM:    General:   in no acute distress  HEENT: MMM, conjunctiva and sclera clear  Lungs": clear  Heart: regular  Gastrointestinal: Soft non-tender non-distended; Normal bowel sounds;   Skin: Warm and dry. No obvious rash  Ext: no edema    LABS:      CBC Full  -  ( 19 Jan 2025 08:43 )  WBC Count : 6.02 K/uL  RBC Count : 3.63 M/uL  Hemoglobin : 10.7 g/dL  Hematocrit : 32.6 %  Platelet Count - Automated : 169 K/uL  Mean Cell Volume : 89.8 fl  Mean Cell Hemoglobin : 29.5 pg  Mean Cell Hemoglobin Concentration : 32.8 g/dL  Auto Neutrophil # : 4.15 K/uL  Auto Lymphocyte # : 1.31 K/uL  Auto Monocyte # : 0.44 K/uL  Auto Eosinophil # : 0.07 K/uL  Auto Basophil # : 0.03 K/uL  Auto Neutrophil % : 68.9 %  Auto Lymphocyte % : 21.8 %  Auto Monocyte % : 7.3 %  Auto Eosinophil % : 1.2 %  Auto Basophil % : 0.5 %    01-19    137  |  102  |  22  ----------------------------<  96  4.1   |  23  |  1.01    Ca    9.1      19 Jan 2025 08:43  Phos  4.0     01-19  Mg     1.9     01-19    TPro  6.3  /  Alb  3.2[L]  /  TBili  0.3  /  DBili  x   /  AST  46[H]  /  ALT  16  /  AlkPhos  43  01-19          Urinalysis Basic - ( 19 Jan 2025 08:43 )    Color: x / Appearance: x / SG: x / pH: x  Gluc: 96 mg/dL / Ketone: x  / Bili: x / Urobili: x   Blood: x / Protein: x / Nitrite: x   Leuk Esterase: x / RBC: x / WBC x   Sq Epi: x / Non Sq Epi: x / Bacteria: x                RADIOLOGY & ADDITIONAL STUDIES (The following images were personally reviewed):

## 2025-01-19 NOTE — OCCUPATIONAL THERAPY INITIAL EVALUATION ADULT - PERTINENT HX OF CURRENT PROBLEM, REHAB EVAL
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

## 2025-01-19 NOTE — DISCHARGE NOTE PROVIDER - PROVIDER TOKENS
PROVIDER:[TOKEN:[[id number]],FOLLOWUP:[2 weeks]],PROVIDER:[TOKEN:[[id number]],FOLLOWUP:[1 week],ESTABLISHEDPATIENT:[T]] PROVIDER:[TOKEN:[[id number]],FOLLOWUP:[2 weeks]],PROVIDER:[TOKEN:[[id number]],SCHEDULEDAPPT:[02/06/2025],SCHEDULEDAPPTTIME:[01:00 PM],ESTABLISHEDPATIENT:[T]] PROVIDER:[TOKEN:[[id number]],SCHEDULEDAPPT:[02/03/2025],SCHEDULEDAPPTTIME:[11:00 AM],ESTABLISHEDPATIENT:[T]],PROVIDER:[TOKEN:[[id number]],SCHEDULEDAPPT:[02/06/2025],SCHEDULEDAPPTTIME:[01:00 PM],ESTABLISHEDPATIENT:[T]] PROVIDER:[TOKEN:[[id number]],SCHEDULEDAPPT:[02/06/2025],SCHEDULEDAPPTTIME:[01:00 PM],ESTABLISHEDPATIENT:[T]]

## 2025-01-19 NOTE — DISCHARGE NOTE PROVIDER - CARE PROVIDERS DIRECT ADDRESSES
,DirectAddress_Unknown,[email or web address]

## 2025-01-19 NOTE — DISCHARGE NOTE PROVIDER - NSDCCPTREATMENT_GEN_ALL_CORE_FT
PRINCIPAL PROCEDURE  Procedure: Provide patient education  Findings and Treatment:       SECONDARY PROCEDURE  Procedure: Echo 2D  Findings and Treatment: Left Ventricle:  There is mild concentric left ventricular hypertrophy. The left ventricle is normal in size and systolic function with a calculated ejection fraction of 70-75%. Left ventricular apex is poorly visualized but appears dyskinetic, with hyperkinesis of the remaining wall segments. Analysis of left ventricular diastolic function and filling pressure is made challenging by the presence of mitral annular calcification.  CONCLUSIONS:   1. Mild symmetric left ventricular hypertrophy.   2. Left ventricular apex is poorly visualized but appears dyskinetic, with hyperkinesis of the remaining wall segments.   3. Recommend repeat limited TTE with intravenous ultrasound enhancing agent if clinically indicated.   4. Normal right ventricular size and systolic function.   5. Moderately dilated left atrium.   6. No other significant valvular disease.   7. Aortic sclerosis without significant stenosis.   8. No evidence of pulmonary hypertension.   9. No pericardial effusion.  10. No prior echo is available for comparison.     PRINCIPAL PROCEDURE  Procedure: Provide patient education  Findings and Treatment: please continue with metoprolol 50mg XL once daily you where previously on metoprolol 100mg XL once daily. please work with your outpatient cardiologist with regards to increase the metoprolol dosing.      SECONDARY PROCEDURE  Procedure: Echo 2D  Findings and Treatment: Left Ventricle:  There is mild concentric left ventricular hypertrophy. The left ventricle is normal in size and systolic function with a calculated ejection fraction of 70-75%. Left ventricular apex is poorly visualized but appears dyskinetic, with hyperkinesis of the remaining wall segments. Analysis of left ventricular diastolic function and filling pressure is made challenging by the presence of mitral annular calcification.  CONCLUSIONS:   1. Mild symmetric left ventricular hypertrophy.   2. Left ventricular apex is poorly visualized but appears dyskinetic, with hyperkinesis of the remaining wall segments.   3. Recommend repeat limited TTE with intravenous ultrasound enhancing agent if clinically indicated.   4. Normal right ventricular size and systolic function.   5. Moderately dilated left atrium.   6. No other significant valvular disease.   7. Aortic sclerosis without significant stenosis.   8. No evidence of pulmonary hypertension.   9. No pericardial effusion.  10. No prior echo is available for comparison.

## 2025-01-19 NOTE — DISCHARGE NOTE PROVIDER - HOSPITAL COURSE
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 Likely 2/2 to orthostasis   Presents with unwitnessed fall with head strike, NO LOC. Able to recall walking to bathroom feeling weak and falling. Able to walk back to bed by herself. Found with stool incontinence. No prior falls, no hx of seizure. CT head, maxillofacial, CT chest without acute findings. A tiny hypodense focus peripherally in the spleen, uncertain if this represents a small cyst or a tiny laceration. ECG: HR 97, QTc 515, RBBB, (not seen in ECG from 2022) no significant ST wave changes. Noted to be orthostatic positive form lying to sitting to standing 126/ 82 to 113/70 to 90/49. TTE: wnl   Plan:   - Interrogation of loop recorder outpatient cardiologist     # Pneumonia due to COVID-19 virus.    COVID + , Reports cough for few days, CXR with vascular congestion otherwise clear lungs. S/p  Remdesiver for 5 days 1/18 - 1/XX   Plan: continue with XXX medication     #Elevated troponin.    Trop 373->307. ECG: HR 97, QTc 515, RBBB, (not seen in ECG from 2022) no significant ST wave changes. TTE: complete 1/17   Continue to follow up outpatient      #HTN (hypertension).    home med: Valsartan 160qd and lisinopril 20 qd held in patient while patient was hypotensive.    Plan: reinitate outpatient with cards or pcp     #Chronic atrial fibrillation.    CHADSVASC ~ 6, patient high risk for stroke    Home med per HIE: Metoprolol  qd and xarelto 15 qd   - continue with metoprolol 12.5mg BID iso of orthostasis increase with outpatient PCP/Cards   - continue with Xarelto 15mg      Polymyalgia rheumatica.    home med: prednisone 5 qd   Plan: c/w prednisone 5 qd.     Renal lesion.    An indeterminate cystic lesion in the right kidney for which further characterization with ultrasound is advised on outpatient basis.   Plan: outpatient f/u.     Patient was discharged to: HOME    New medications:   Changes to old medications:  Medications that were stopped:    Items to follow up as outpatient:    PHYSICAL EXAM:  Constitutional: Resting comfortably in bed; NAD  Head: notable laceration on forehead   Eyes: PERRL, EOMI, clear conjunctiva  ENT: no nasal discharge; dry mucous membranes  Neck: supple;   Respiratory: CTA B/L; no W/R/R, no retractions  Cardiac: +S1/S2; RRR; no M/R/G;  Gastrointestinal: soft, NT/ND; no rebound or guarding; +BSx4  Extremities: WWP, no clubbing or cyanosis; no peripheral edema  Musculoskeletal:; no joint swelling, tenderness or erythema  Vascular: 2+ radial, femoral, DP/PT pulses B/L  Dermatologic: skin warm, dry and intact; no rashes, wounds, or scars  Neurologic: AAOx2 (person and place, not time); strength 4/5 in UE and LE bilaterally   #Discharge: do not delete    Hospital course (by problem):   Patient is 89 y.o PMHx of significant for Xarelto, on Eliquis, polymyalgia rheumatica, L BCA, and 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 Likely 2/2 to orthostasis   Presents with unwitnessed fall with head strike, NO LOC. Able to recall walking to bathroom feeling weak and falling. Able to walk back to bed by herself. Found with stool incontinence. No prior falls, no hx of seizure. CT head, maxillofacial, CT chest without acute findings. A tiny hypodense focus peripherally in the spleen, uncertain if this represents a small cyst or a tiny laceration. ECG: HR 97, QTc 515, RBBB, (not seen in ECG from 2022) no significant ST wave changes. Noted to be orthostatic positive form lying to sitting to standing 126/ 82 to 113/70 to 90/49. TTE: wnl   Plan:   - Interrogation of loop recorder outpatient cardiologist     # Pneumonia due to COVID-19 virus.    COVID + , Reports cough for few days, CXR with vascular congestion otherwise clear lungs. S/p  Remdesiver for 5 days 1/18 - 1/XX   Plan: continue with XXX medication     #Elevated troponin.    Trop 373->307. ECG: HR 97, QTc 515, RBBB, (not seen in ECG from 2022) no significant ST wave changes. TTE: complete 1/17   Continue to follow up outpatient      #HTN (hypertension).    home med: Valsartan 160qd and lisinopril 20 qd held in patient while patient was hypotensive.    Plan: reinitate outpatient with cards or pcp     #Chronic atrial fibrillation.    CHADSVASC ~ 6, patient high risk for stroke    Home med per HIE: Metoprolol  qd and xarelto 15 qd   - continue with metoprolol XL 50 mg QD can plan to uptitrate to home dose withoutpatient PCP/Cards   - continue with Xarelto 15mg      Polymyalgia rheumatica.    home med: prednisone 5 qd   Plan: c/w prednisone 5 qd.     Renal lesion.    An indeterminate cystic lesion in the right kidney for which further characterization with ultrasound is advised on outpatient basis.   Plan: outpatient f/u.     Patient was discharged to: HOME    New medications:   Changes to old medications: toprol xl 100 mg --> 50 mg XL until uptitrated outpatient   Medications that were stopped:    Items to follow up as outpatient:    PHYSICAL EXAM:  Constitutional: Resting comfortably in bed; NAD  Head: notable laceration on forehead   Eyes: PERRL, EOMI, clear conjunctiva  ENT: no nasal discharge; dry mucous membranes  Neck: supple;   Respiratory: CTA B/L; no W/R/R, no retractions  Cardiac: +S1/S2; RRR; no M/R/G;  Gastrointestinal: soft, NT/ND; no rebound or guarding; +BSx4  Extremities: WWP, no clubbing or cyanosis; no peripheral edema  Musculoskeletal:; no joint swelling, tenderness or erythema  Vascular: 2+ radial, femoral, DP/PT pulses B/L  Dermatologic: skin warm, dry and intact; no rashes, wounds, or scars  Neurologic: AAOx2 (person and place, not time); strength 4/5 in UE and LE bilaterally   #Discharge: do not delete    Hospital course (by problem):   Patient is 89 y.o PMHx of significant for Xarelto, on Eliquis, polymyalgia rheumatica, L BCA, and 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 Likely 2/2 to orthostasis   Presents with unwitnessed fall with head strike, NO LOC. Able to recall walking to bathroom feeling weak and falling. Able to walk back to bed by herself. Found with stool incontinence. No prior falls, no hx of seizure. CT head, maxillofacial, CT chest without acute findings. A tiny hypodense focus peripherally in the spleen, uncertain if this represents a small cyst or a tiny laceration. ECG: HR 97, QTc 515, RBBB, (not seen in ECG from 2022) no significant ST wave changes. Noted to be orthostatic positive form lying to sitting to standing 126/ 82 to 113/70 to 90/49. TTE: wnl   Plan:   - monitor BP at home    # Pneumonia due to COVID-19 virus.    COVID + , Reports cough for few days, CXR with vascular congestion otherwise clear lungs. S/p  Remdesiver for 5 days 1/18 - 1/XX   Plan: s/p treatment with remdesiver    #Elevated troponin.    Trop 373->307. ECG: HR 97, QTc 515, RBBB, (not seen in ECG from 2022) no significant ST wave changes. TTE: complete 1/17   Continue to follow up outpatient      #HTN (hypertension).    home med: Valsartan 160qd and lisinopril 20 qd held in patient while patient was hypotensive.    Plan: re-initiate outpatient with cards or pcp     #Chronic atrial fibrillation.    CHADSVASC ~ 6, patient high risk for stroke    Home med per HIE: Metoprolol  qd and xarelto 15 qd   - continue with metoprolol XL 50 mg QD can plan to uptitrate to home dose with outpatient PCP/Cards   - continue with Xarelto 15mg      Polymyalgia rheumatica.    home med: prednisone 5 qd   Plan: c/w prednisone 5 qd.     Renal lesion.    An indeterminate cystic lesion in the right kidney for which further characterization with ultrasound is advised on outpatient basis.   Plan: outpatient f/u.     Patient was discharged to: HOME    New medications:   Changes to old medications: toprol xl 100 mg --> 50 mg XL until uptitrated outpatient   Medications that were stopped:    Items to follow up as outpatient:    PHYSICAL EXAM:  Constitutional: Resting comfortably in bed; NAD  Head: notable laceration on forehead   Eyes: PERRL, EOMI, clear conjunctiva  ENT: no nasal discharge; dry mucous membranes  Neck: supple;   Respiratory: CTA B/L; no W/R/R, no retractions  Cardiac: +S1/S2; RRR; no M/R/G;  Gastrointestinal: soft, NT/ND; no rebound or guarding; +BSx4  Extremities: WWP, no clubbing or cyanosis; no peripheral edema  Musculoskeletal:; no joint swelling, tenderness or erythema  Vascular: 2+ radial, femoral, DP/PT pulses B/L  Dermatologic: skin warm, dry and intact; no rashes, wounds, or scars  Neurologic: AAOx2 (person and place, not time); strength 4/5 in UE and LE bilaterally

## 2025-01-19 NOTE — DISCHARGE NOTE PROVIDER - CARE PROVIDER_API CALL
AAKASH GRANDA, Phys,    Phone: ()-  Fax: ()-  Follow Up Time: 2 weeks    Maikol Hernandez  Gastroenterology  [address], [address] [place] [address]  Phone: [phone]  Fax: [phone]  Established Patient  Follow Up Time: 1 week   AAKASH GRANDA, Phys,    Phone: ()-  Fax: ()-  Follow Up Time: 2 weeks    Maikol Hernandez  Gastroenterology  [address], [address] [place] [address]  Phone: [phone]  Fax: [phone]  Established Patient  Scheduled Appointment: 02/06/2025 01:00 PM   AAKASH GRANDA, Phys,    Phone: ()-  Fax: ()-  Established Patient  Scheduled Appointment: 02/03/2025 11:00 AM    Maikol Hernandez  Gastroenterology  [address], [address] [address]  Phone: [phone]  Fax: [phone]  Established Patient  Scheduled Appointment: 02/06/2025 01:00 PM   Maikol Hernandez  Gastroenterology  [address], [address] [address]  Phone: [phone]  Fax: [phone]  Established Patient  Scheduled Appointment: 02/06/2025 01:00 PM

## 2025-01-19 NOTE — DISCHARGE NOTE PROVIDER - NSDCMRMEDTOKEN_GEN_ALL_CORE_FT
levothyroxine: 25 microgram(s) orally once a day  metoprolol succinate 100 mg oral capsule, extended release: 1 cap(s) orally  predniSONE 5 mg oral tablet: 1 tab(s) orally once a day  rosuvastatin 10 mg oral tablet: 1 tab(s) orally once a day  Xarelto 15 mg oral tablet: 1 tab(s) orally once a day   levothyroxine: 25 microgram(s) orally once a day  metoprolol succinate 50 mg oral capsule, extended release: 50 milligram(s) orally once a day  predniSONE 5 mg oral tablet: 1 tab(s) orally once a day  rosuvastatin 10 mg oral tablet: 1 tab(s) orally once a day  Xarelto 15 mg oral tablet: 1 tab(s) orally once a day   levothyroxine: 25 microgram(s) orally once a day  Metoprolol Succinate ER 50 mg oral tablet, extended release: 1 tab(s) orally once a day  predniSONE 5 mg oral tablet: 1 tab(s) orally once a day  rosuvastatin 10 mg oral tablet: 1 tab(s) orally once a day  Xarelto 15 mg oral tablet: 1 tab(s) orally once a day

## 2025-01-19 NOTE — OCCUPATIONAL THERAPY INITIAL EVALUATION ADULT - MODALITIES TREATMENT COMMENTS
Pt able to perform bed mobility, requiring CGAx1 2/2 impaired trunk/BLE strength. Pt able to perform UB dressing with supervision while seated at EOB. Pt performed LB dressing while seated at EOB, requiring CGAx1 2/2 impaired dynamic balance and decreased functional reach. Pt able to perform sit<->stand with Min Ax1 and ambulated in room with SC, requiring CGAx1 2/2 impaired balance with decreased step length. Pt able to progress to ambulation with SBAx1, demo continued unsteadiness, however no LOB observed throughout. Pt performed toilet transfer with Min Ax1, demo impaired BLE strength. Pt able to perform toilet hygiene with supervision. Pt left seated OOBTC in recliner, +all lines, +call bell, NAD, CESIA t/o, LIZZIE Carreno made aware.

## 2025-01-19 NOTE — DISCHARGE NOTE PROVIDER - NSDCCPCAREPLAN_GEN_ALL_CORE_FT
PRINCIPAL DISCHARGE DIAGNOSIS  Diagnosis: COVID  Assessment and Plan of Treatment: COVID-19, caused by the SARS-CoV-2 virus, is a respiratory illness that can range from mild to severe. Common symptoms include fever, cough, shortness of breath, fatigue, muscle aches, loss of taste or smell, sore throat, congestion, and headache. It spreads primarily through respiratory droplets produced when an infected person coughs, sneezes, or talks. Protecting yourself involves getting vaccinated and boosted, frequent handwashing with soap and water or using hand , wearing a mask in crowded indoor settings, especially if community levels are high, and staying home if you're sick. If you test positive or experience symptoms, isolate yourself from others, including members of your household, and consult your healthcare provider. They can advise you on managing your symptoms and discuss available treatments. Long COVID is a potential complication involving persistent symptoms or the development of new ones weeks or months after the initial infection. Staying up-to-date with the latest recommendations from public health authorities like the CDC and WHO is crucial for staying informed and protected.        SECONDARY DISCHARGE DIAGNOSES  Diagnosis: Fall  Assessment and Plan of Treatment: Orthostatic hypotension, also known as postural hypotension, is a form of low blood pressure that occurs when you stand up from sitting or lying down. You may experience symptoms like dizziness, lightheadedness, blurred vision, weakness, or even fainting. This happens because blood pools in your legs when you're sitting or lying down, and upon standing, your body may not react quickly enough to pump the blood back up to your brain. Several factors can contribute to orthostatic hypotension, including dehydration, certain medications, neurological conditions, and aging. To manage this condition, try to stand up slowly and in stages. Ensure you're well-hydrated by drinking plenty of fluids. Avoid prolonged standing, especially in hot environments. Compression stockings can help improve blood flow. If you experience frequent episodes, talk to your doctor, as they may adjust your medications or recommend other strategies to help manage your symptoms. It's important to be cautious when changing positions to prevent falls and injuries.

## 2025-01-19 NOTE — PROGRESS NOTE ADULT - SUBJECTIVE AND OBJECTIVE BOX
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  Patient presented for unwitnessed fall with head strike. Per daughter at bedside, mom was last seen by friends last night in usual state of health. This morning daughter called patient not answering. Other daughter then went home found patient in bed, appeared drowsy, was noted to be incontinent of stool  and called EMS. Upon arrival of EMS: She was found hypotensive, tachycardic, with fever received 500cc of NS and Tylenol for fever. As per daughter, pt had been coughing x few days. Upon arrivial to ED: after IV fluids pt much improved, able to recall walking to bathroom, feeling weak and falling, hitting face on ground. Denies loc.   In the ED: FAST exam performed and neg for free fluid. Pt evaluated by cards given elevated trop: thought to be due to demand ischemia from COVID/ hypotension; no indications for cards tele.     In the ED:   VS: T 98.5, T 105->97, BP 96/52->111/55, spo2 96% in RA, rr 18  Labs: WBC 11.56, Hgb 10.1, Plt 191, Trop 373->307, Lactate 2.1->1.8, Bicarb 25, AG 17, creatinine 1.25, COVID +,   Imaging:   CT HEAD: No acute abnormality. Chronic changes as above. Extensive periventricular hypoattenuation, compatible with advanced chronic microvascular ischemic changes. There is a small chronic lacunar infarct in the left corona radiata  CT FACIAL BONE: No acute abnormality  CT chest, a/p: No acute abnormality in the chest. A tiny hypodense focus peripherally in the spleen, uncertain if this represents a small cyst or a tiny laceration. No associated fluid or fatty stranding.  No acute rib fractures. An indeterminate cystic lesion in the right kidney for which further characterization with ultrasound is advised on outpatient basis.    ECG: HR 97, QTc 515, RBBB, (not seen in ECG from 2022) no significant ST wave changes  Intervnetion: Tdap, levofloxasin, Paxlovid, NS 2.5 L   (16 Jan 2025 21:14)                            10.7   6.02  )-----------( 169      ( 19 Jan 2025 08:43 )             32.6       01-19    137  |  102  |  22  ----------------------------<  96  4.1   |  23  |  1.01    Ca    9.1      19 Jan 2025 08:43  Phos  4.0     01-19  Mg     1.9     01-19    TPro  6.3  /  Alb  3.2[L]  /  TBili  0.3  /  DBili  x   /  AST  46[H]  /  ALT  16  /  AlkPhos  43  01-19    Vital Signs Last 24 Hrs  T(C): 36.4 (19 Jan 2025 06:09), Max: 37.5 (18 Jan 2025 16:38)  T(F): 97.5 (19 Jan 2025 06:09), Max: 99.5 (18 Jan 2025 16:38)  HR: 83 (19 Jan 2025 06:09) (83 - 86)  BP: 131/71 (19 Jan 2025 06:09) (98/65 - 131/71)  BP(mean): 91 (19 Jan 2025 06:09) (76 - 91)  RR: 18 (19 Jan 2025 06:09) (17 - 20)  SpO2: 97% (19 Jan 2025 06:09) (93% - 97%)    Parameters below as of 19 Jan 2025 06:09  Patient On (Oxygen Delivery Method): room air        MEDICATIONS  (STANDING):  influenza  Vaccine (HIGH DOSE) 0.5 milliLiter(s) IntraMuscular once  levothyroxine 25 MICROGram(s) Oral daily  predniSONE   Tablet 5 milliGRAM(s) Oral daily  remdesivir  IVPB   IV Intermittent   remdesivir  IVPB 100 milliGRAM(s) IV Intermittent every 24 hours  rivaroxaban 15 milliGRAM(s) Oral with dinner    MEDICATIONS  (PRN):  acetaminophen     Tablet .. 650 milliGRAM(s) Oral every 6 hours PRN Temp greater or equal to 38C (100.4F), Mild Pain (1 - 3)  aluminum hydroxide/magnesium hydroxide/simethicone Suspension 30 milliLiter(s) Oral every 4 hours PRN Dyspepsia  melatonin 3 milliGRAM(s) Oral at bedtime PRN Insomnia      T(C): 36.4 (01-19-25 @ 06:09), Max: 37.5 (01-18-25 @ 16:38)  HR: 83 (01-19-25 @ 06:09) (83 - 86)  BP: 131/71 (01-19-25 @ 06:09) (98/65 - 131/71)  RR: 18 (01-19-25 @ 06:09) (17 - 20)  SpO2: 97% (01-19-25 @ 06:09) (93% - 97%)    Physical Exam:   on [place] and CONTACT COVID 19  isolation ,  89 y o man lying comfortably in semi Maciel's position , awake , alert , no acute complaints     Head: normocephalic , atraumatic    Eyes: PERRLA , EOMI , no nystagmus , sclera anicteric    ENT / FACE: neg nasal discharge , uvula midline , no oropharyngeal erythema / exudate    Neck: supple , negative JVD , negative carotid bruits , no thyromegaly    Chest: CTA bilaterally     Cardiovascular: regular rate and rhythm , neg murmurs / rubs / gallops    Abdomen: soft , non distended , no tenderness to palpation in all 4 quadrants ,  normal bowel sounds     Extremities: WWP , neg cyanosis /clubbing / edema     Neurologic Exam:     Alert and oriented to person , place    Cranial Nerves:           II:                         pupils equal , round and reactive to light , visual fields intact         III/ IV/VI:             extraocular movements intact , neg nystagmus , neg ptosis        V:                        facial sensation intact , V1-3 normal        VII:                      face symmetric , no droop , normal eye closure and smile        VIII:                     hearing intact to finger rub bilaterally        IX and X:             no hoarseness , gag intact , palate/ uvula rise symmetrically        XI:                       SCM / trapezius strength intact bilateral        XII:                      no tongue deviation    Motor Exam:        > 3+/5 x 4 extremities , without drift     Sensation:         intact to light touch x 4 extremities                            no neglect or extinction on double simultaneous testing    DTR:           biceps/brachioradialis: equal                            patella/ankle: equal          neg Babinski     Coordination:            Finger to Nose:  neg dysmetria bilaterally          Initial Functional Status Assessment :       Previous Level of Function:     · Ambulation Skills	independent; needs device  · Transfer Skills	independent; needs device  · ADL Skills	independent; needs device  · Additional Comments	Pt lives alone in an apt with elevator, denies stairs. Prior to admission, pt ambulated with SC. Pt has a bath tub. Pt has a private hire aide 5 days  7 hours.    Cognitive Status Examination:   · Orientation	oriented to person, place, time and situation  · Level of Consciousness	alert  · Follows Commands and Answers Questions	100% of the time; able to follow multistep instructions  · Personal Safety and Judgment	intact    Range of Motion Exam:   · Active Range of Motion Examination	AROM WFL through out    Manual Muscle Testing:   · Manual Muscle Testing Results	b/l UEs >3+/5 t/o, b/l LEs >3+/5 t/o.    Bed Mobility: Scooting/Bridging:     · Level of Ada	minimum assist (75% patients effort)  · Physical Assist/Nonphysical Assist	1 person assist; verbal cues    Bed Mobility: Sit to Supine:     · Level of Ada	minimum assist (75% patients effort)  · Physical Assist/Nonphysical Assist	1 person assist; verbal cues    Bed Mobility: Supine to Sit:     · Level of Ada	moderate assist (50% patients effort)  · Physical Assist/Nonphysical Assist	1 person assist; verbal cues    Bed Mobility Analysis:     · Bed Mobility Limitations	decreased ability to use arms for pushing/pulling; decreased ability to use legs for bridging/pushing; impaired ability to control trunk for mobility  · Impairments Contributing to Impaired Bed Mobility	impaired balance; impaired postural control; decreased strength    Transfer: Sit to Stand:     · Level of Ada	moderate assist (50% patients effort)  · Physical Assist/Nonphysical Assist	1 person assist; verbal cues  · Assistive Device	rolling walker    Transfer: Stand to Sit:     · Level of Ada	moderate assist (50% patients effort)  · Physical Assist/Nonphysical Assist	1 person assist; verbal cues  · Assistive Device	rolling walker    Sit/Stand Transfer Safety Analysis:     · Transfer Safety Concerns Noted	requires VCs from  PT for proper hands placement during transfer.  · Impairments Contributing to Impaired Transfers	impaired balance; impaired postural control; decreased strength    Gait Skills:     · Level of Ada	moderate assist (50% patients effort)  · Physical Assist/Nonphysical Assist	verbal cues; 1 person assist  · Assistive Device	rolling walker  · Gait Distance	6 side steps.    Gait Analysis:     · Gait Pattern Used	3-point gait  · Gait Deviations Noted	unsteady gait, no lose of balance,; increased time in double stance; decreased weight-shifting ability  · Impairments Contributing to Gait Deviations	impaired balance; impaired postural control; decreased strength; Pt unable to ambulate further distance secondary pt complains feeling lightheadedness and request to sit down(vital sign documented in flow sheet)    Balance Skills Assessment:     · Sitting Balance: Static	fair plus  · Sitting Balance: Dynamic	fair balance  · Sit-to-Stand Balance	fair minus  · Standing Balance: Static	fair minus  · Standing Balance: Dynamic	poor plus    Clinical Impressions:   · Criteria for Skilled Therapeutic Interventions	impairments found; functional limitations in following categories; rehab potential; therapy frequency; anticipated discharge recommendation  · Impairments Found (describe specific impairments)	aerobic capacity/endurance; muscle strength; gross motor; gait, locomotion, and balance; posture  · Functional Limitations in Following Categories (describe specific limitations)	self-care; home management; community/leisure            PM&R Impression : as above    Current disposition plan recommendation :    subacute rehab placement

## 2025-01-19 NOTE — OCCUPATIONAL THERAPY INITIAL EVALUATION ADULT - NSOTDISCHREC_GEN_A_CORE
pt would benefit from 1 person assist for all OOB mobility/ADLs (pt and pt's daughter at bedside reporting that HHA will be staying with pt 24/7)/Home OT

## 2025-01-19 NOTE — OCCUPATIONAL THERAPY INITIAL EVALUATION ADULT - GENERAL OBSERVATIONS, REHAB EVAL
OT IE completed. Orders received, chart reviewed, pt cleared for OT by LIZZIE Carreno. Pt received semi supine in bed, NAD, +heplock, +prima fit. Pt A&Ox4, agreeable to OT, and tolerated session well.

## 2025-01-20 ENCOUNTER — TRANSCRIPTION ENCOUNTER (OUTPATIENT)
Age: 89
End: 2025-01-20

## 2025-01-20 LAB
ALBUMIN SERPL ELPH-MCNC: 3.1 G/DL — LOW (ref 3.3–5)
ALP SERPL-CCNC: 40 U/L — SIGNIFICANT CHANGE UP (ref 40–120)
ALT FLD-CCNC: 17 U/L — SIGNIFICANT CHANGE UP (ref 10–45)
ANION GAP SERPL CALC-SCNC: 13 MMOL/L — SIGNIFICANT CHANGE UP (ref 5–17)
AST SERPL-CCNC: 33 U/L — SIGNIFICANT CHANGE UP (ref 10–40)
BASOPHILS # BLD AUTO: 0.01 K/UL — SIGNIFICANT CHANGE UP (ref 0–0.2)
BASOPHILS NFR BLD AUTO: 0.2 % — SIGNIFICANT CHANGE UP (ref 0–2)
BILIRUB SERPL-MCNC: 0.2 MG/DL — SIGNIFICANT CHANGE UP (ref 0.2–1.2)
BLD GP AB SCN SERPL QL: POSITIVE — SIGNIFICANT CHANGE UP
BUN SERPL-MCNC: 24 MG/DL — HIGH (ref 7–23)
CALCIUM SERPL-MCNC: 8.6 MG/DL — SIGNIFICANT CHANGE UP (ref 8.4–10.5)
CHLORIDE SERPL-SCNC: 101 MMOL/L — SIGNIFICANT CHANGE UP (ref 96–108)
CO2 SERPL-SCNC: 24 MMOL/L — SIGNIFICANT CHANGE UP (ref 22–31)
CREAT SERPL-MCNC: 1.14 MG/DL — SIGNIFICANT CHANGE UP (ref 0.5–1.3)
CRP SERPL-MCNC: 50.7 MG/L — HIGH (ref 0–4)
EGFR: 46 ML/MIN/1.73M2 — LOW
EOSINOPHIL # BLD AUTO: 0.12 K/UL — SIGNIFICANT CHANGE UP (ref 0–0.5)
EOSINOPHIL NFR BLD AUTO: 2.7 % — SIGNIFICANT CHANGE UP (ref 0–6)
ERYTHROCYTE [SEDIMENTATION RATE] IN BLOOD: 42 MM/HR — HIGH
GLUCOSE SERPL-MCNC: 95 MG/DL — SIGNIFICANT CHANGE UP (ref 70–99)
HCT VFR BLD CALC: 29.3 % — LOW (ref 34.5–45)
HGB BLD-MCNC: 9.6 G/DL — LOW (ref 11.5–15.5)
IMM GRANULOCYTES NFR BLD AUTO: 0.2 % — SIGNIFICANT CHANGE UP (ref 0–0.9)
LYMPHOCYTES # BLD AUTO: 1.95 K/UL — SIGNIFICANT CHANGE UP (ref 1–3.3)
LYMPHOCYTES # BLD AUTO: 44.1 % — HIGH (ref 13–44)
MAGNESIUM SERPL-MCNC: 1.7 MG/DL — SIGNIFICANT CHANGE UP (ref 1.6–2.6)
MCHC RBC-ENTMCNC: 29.1 PG — SIGNIFICANT CHANGE UP (ref 27–34)
MCHC RBC-ENTMCNC: 32.8 G/DL — SIGNIFICANT CHANGE UP (ref 32–36)
MCV RBC AUTO: 88.8 FL — SIGNIFICANT CHANGE UP (ref 80–100)
MONOCYTES # BLD AUTO: 0.44 K/UL — SIGNIFICANT CHANGE UP (ref 0–0.9)
MONOCYTES NFR BLD AUTO: 10 % — SIGNIFICANT CHANGE UP (ref 2–14)
NEUTROPHILS # BLD AUTO: 1.89 K/UL — SIGNIFICANT CHANGE UP (ref 1.8–7.4)
NEUTROPHILS NFR BLD AUTO: 42.8 % — LOW (ref 43–77)
NRBC # BLD: 0 /100 WBCS — SIGNIFICANT CHANGE UP (ref 0–0)
NRBC BLD-RTO: 0 /100 WBCS — SIGNIFICANT CHANGE UP (ref 0–0)
PHOSPHATE SERPL-MCNC: 4.3 MG/DL — SIGNIFICANT CHANGE UP (ref 2.5–4.5)
PLATELET # BLD AUTO: 170 K/UL — SIGNIFICANT CHANGE UP (ref 150–400)
POTASSIUM SERPL-MCNC: 3.8 MMOL/L — SIGNIFICANT CHANGE UP (ref 3.5–5.3)
POTASSIUM SERPL-SCNC: 3.8 MMOL/L — SIGNIFICANT CHANGE UP (ref 3.5–5.3)
PROT SERPL-MCNC: 5.9 G/DL — LOW (ref 6–8.3)
RBC # BLD: 3.3 M/UL — LOW (ref 3.8–5.2)
RBC # FLD: 14.6 % — HIGH (ref 10.3–14.5)
RH IG SCN BLD-IMP: NEGATIVE — SIGNIFICANT CHANGE UP
SODIUM SERPL-SCNC: 138 MMOL/L — SIGNIFICANT CHANGE UP (ref 135–145)
WBC # BLD: 4.42 K/UL — SIGNIFICANT CHANGE UP (ref 3.8–10.5)
WBC # FLD AUTO: 4.42 K/UL — SIGNIFICANT CHANGE UP (ref 3.8–10.5)

## 2025-01-20 PROCEDURE — 86077 PHYS BLOOD BANK SERV XMATCH: CPT

## 2025-01-20 RX ORDER — METOPROLOL SUCCINATE 25 MG
1 TABLET, EXTENDED RELEASE 24 HR ORAL
Refills: 0 | DISCHARGE

## 2025-01-20 RX ORDER — POTASSIUM CHLORIDE 750 MG/1
20 TABLET, EXTENDED RELEASE ORAL ONCE
Refills: 0 | Status: COMPLETED | OUTPATIENT
Start: 2025-01-20 | End: 2025-01-20

## 2025-01-20 RX ORDER — MAGNESIUM SULFATE 0.8 MEQ/ML
2 AMPUL (ML) INJECTION ONCE
Refills: 0 | Status: COMPLETED | OUTPATIENT
Start: 2025-01-20 | End: 2025-01-20

## 2025-01-20 RX ORDER — METOPROLOL SUCCINATE 25 MG
25 TABLET, EXTENDED RELEASE 24 HR ORAL EVERY 24 HOURS
Refills: 0 | Status: DISCONTINUED | OUTPATIENT
Start: 2025-01-21 | End: 2025-01-21

## 2025-01-20 RX ORDER — METOPROLOL SUCCINATE 25 MG
50 TABLET, EXTENDED RELEASE 24 HR ORAL
Qty: 0 | Refills: 0 | DISCHARGE
Start: 2025-01-20

## 2025-01-20 RX ADMIN — REMDESIVIR 200 MILLIGRAM(S): 100 INJECTION, POWDER, LYOPHILIZED, FOR SOLUTION INTRAVENOUS at 05:53

## 2025-01-20 RX ADMIN — Medication 12.5 MILLIGRAM(S): at 05:52

## 2025-01-20 RX ADMIN — ACETAMINOPHEN 650 MILLIGRAM(S): 160 SUSPENSION ORAL at 13:00

## 2025-01-20 RX ADMIN — RIVAROXABAN 15 MILLIGRAM(S): 20 TABLET, FILM COATED ORAL at 18:46

## 2025-01-20 RX ADMIN — ACETAMINOPHEN 650 MILLIGRAM(S): 160 SUSPENSION ORAL at 12:33

## 2025-01-20 RX ADMIN — LEVOTHYROXINE SODIUM 25 MICROGRAM(S): 25 TABLET ORAL at 05:52

## 2025-01-20 RX ADMIN — Medication 25 GRAM(S): at 10:34

## 2025-01-20 RX ADMIN — Medication 12.5 MILLIGRAM(S): at 18:47

## 2025-01-20 RX ADMIN — PREDNISONE 5 MILLIGRAM(S): 5 TABLET ORAL at 05:52

## 2025-01-20 RX ADMIN — POTASSIUM CHLORIDE 20 MILLIEQUIVALENT(S): 750 TABLET, EXTENDED RELEASE ORAL at 10:34

## 2025-01-20 RX ADMIN — ACETAMINOPHEN 650 MILLIGRAM(S): 160 SUSPENSION ORAL at 18:46

## 2025-01-20 RX ADMIN — ACETAMINOPHEN 650 MILLIGRAM(S): 160 SUSPENSION ORAL at 19:15

## 2025-01-20 NOTE — PROGRESS NOTE ADULT - PROBLEM SELECTOR PLAN 6
CHADSVASC ~ 6, patient high risk for stroke   Home med per HIE: Metoprolol  qd and xarelto 15 qd    Plan:   - metoprolol 12.5mg BID can increase to 25mg 1/20  - on Xarelto 15mg

## 2025-01-20 NOTE — DISCHARGE NOTE NURSING/CASE MANAGEMENT/SOCIAL WORK - NSDCFUADDAPPT_GEN_ALL_CORE_FT
f/u PCP - 2/6/25 at 1:00 PM with Maikol Hernandez MD ([address], [address], [place] [address])    f/u Cardiology - 2/3/25 at 11:00 AM with Rik Allen MD ([address], [place] [address])   - need appt with Dr. Allen [facility] [phone]

## 2025-01-20 NOTE — DISCHARGE NOTE NURSING/CASE MANAGEMENT/SOCIAL WORK - NSDCVIVACCINE_GEN_ALL_CORE_FT
Tdap; 16-Jan-2025 15:22; Anne Bruce (LIZZIE); [employer]; [id number] (Exp. Date: 01-Aug-2026); IntraMuscular; Deltoid Left.; 0.5 milliLiter(s); VIS (VIS Published: 09-May-2013, VIS Presented: 16-Jan-2025);

## 2025-01-20 NOTE — PROGRESS NOTE ADULT - SUBJECTIVE AND OBJECTIVE BOX
Patient is a 89y old  Female who presents with a chief complaint of Fall (19 Jan 2025 19:23)      OVERNIGHT EVENTS: NAEON    SUBJECTIVE: patient exam at the bedside this morning no fever, no chills, chest pain, shortness of breath. Rest for symptoms have improved. No bowel movement in two days. improvements with nausea. She’s been making adequate urine states she has costochondritis like pain on her left side requested lidocaine patch understands that she is on her fourth day of Covid treatment noted to be hypertensive per nurse given Toprol early. She reports that she is supposed to get a nuclear stress test this thursday for preoperative evaluation prior to her knee replacement in february.     ROS: otherwise negative      T(C): 36.7 (01-20-25 @ 05:41), Max: 36.8 (01-19-25 @ 20:39)  HR: 80 (01-20-25 @ 05:41) (79 - 105)  BP: 166/89 (01-20-25 @ 05:41) (121/72 - 166/89)  RR: 18 (01-20-25 @ 05:41) (18 - 20)  SpO2: 94% (01-20-25 @ 05:41) (94% - 98%)  Wt(kg): --Vital Signs Last 24 Hrs  T(C): 36.7 (20 Jan 2025 05:41), Max: 36.8 (19 Jan 2025 20:39)  T(F): 98.1 (20 Jan 2025 05:41), Max: 98.2 (19 Jan 2025 20:39)  HR: 80 (20 Jan 2025 05:41) (79 - 105)  BP: 166/89 (20 Jan 2025 05:41) (121/72 - 166/89)  BP(mean): 115 (20 Jan 2025 05:41) (88 - 115)  RR: 18 (20 Jan 2025 05:41) (18 - 20)  SpO2: 94% (20 Jan 2025 05:41) (94% - 98%)    Parameters below as of 20 Jan 2025 05:41  Patient On (Oxygen Delivery Method): room air        PHYSICAL EXAM:  Constitutional: Resting comfortably in bed; NAD  Head: notable laceration on forehead healing  Eyes: PERRL, EOMI, clear conjunctiva  ENT: no nasal discharge; dry mucous membranes  Neck: supple;   Respiratory: CTA B/L; no W/R/R, no retractions  Cardiac: +S1/S2; RRR; no M/R/G;  Gastrointestinal: soft, NT/ND; no rebound or guarding; +BSx4  Extremities: WWP, no clubbing or cyanosis; no peripheral edema  Musculoskeletal:; no joint swelling, tenderness or erythema  Vascular: 2+ radial, femoral, DP/PT pulses B/L  Dermatologic: skin warm, dry and intact; no rashes, wounds, or scars  Neurologic: AAOx3 (person and place, not time); strength 4/5 in UE and LE bilaterally    LABS:                        10.7   6.02  )-----------( 169      ( 19 Jan 2025 08:43 )             32.6     01-19    137  |  102  |  22  ----------------------------<  96  4.1   |  23  |  1.01    Ca    9.1      19 Jan 2025 08:43  Phos  4.0     01-19  Mg     1.9     01-19    TPro  6.3  /  Alb  3.2[L]  /  TBili  0.3  /  DBili  x   /  AST  46[H]  /  ALT  16  /  AlkPhos  43  01-19        Urinalysis Basic - ( 19 Jan 2025 08:43 )    Color: x / Appearance: x / SG: x / pH: x  Gluc: 96 mg/dL / Ketone: x  / Bili: x / Urobili: x   Blood: x / Protein: x / Nitrite: x   Leuk Esterase: x / RBC: x / WBC x   Sq Epi: x / Non Sq Epi: x / Bacteria: x      CAPILLARY BLOOD GLUCOSE            Urinalysis Basic - ( 19 Jan 2025 08:43 )    Color: x / Appearance: x / SG: x / pH: x  Gluc: 96 mg/dL / Ketone: x  / Bili: x / Urobili: x   Blood: x / Protein: x / Nitrite: x   Leuk Esterase: x / RBC: x / WBC x   Sq Epi: x / Non Sq Epi: x / Bacteria: x        MEDICATIONS  (STANDING):  influenza  Vaccine (HIGH DOSE) 0.5 milliLiter(s) IntraMuscular once  levothyroxine 25 MICROGram(s) Oral daily  metoprolol tartrate 12.5 milliGRAM(s) Oral every 12 hours  predniSONE   Tablet 5 milliGRAM(s) Oral daily  remdesivir  IVPB   IV Intermittent   remdesivir  IVPB 100 milliGRAM(s) IV Intermittent every 24 hours  rivaroxaban 15 milliGRAM(s) Oral with dinner    MEDICATIONS  (PRN):  acetaminophen     Tablet .. 650 milliGRAM(s) Oral every 6 hours PRN Temp greater or equal to 38C (100.4F), Mild Pain (1 - 3)  aluminum hydroxide/magnesium hydroxide/simethicone Suspension 30 milliLiter(s) Oral every 4 hours PRN Dyspepsia  melatonin 3 milliGRAM(s) Oral at bedtime PRN Insomnia      RADIOLOGY & ADDITIONAL TESTS: Reviewed

## 2025-01-20 NOTE — PROGRESS NOTE ADULT - PROBLEM SELECTOR PLAN 1
Presents with unwitnessed fall with head strike, NO LOC. Able to recall walking to bathroom feeling weak and falling. Able to walk back to bed by herself. Found with stool incontinence  No prior falls, no hx of seizure  On physical exam without focal deficit  CT head, maxillofacial, CT chest without acute findings. A tiny hypodense focus peripherally in the spleen, uncertain if this represents a small cyst or a tiny laceration.  ECG: HR 97, QTc 515, RBBB, (not seen in ECG from 2022) no significant ST wave changes  DDX: Likely orthostatic given hypotension vs low concern for seizure (as patient able to recall events) vs cardiogenic (loop recorder currently not working and no recent TTE available) vs vasovagal  Noted to be orthostatic positive form lying to sitting to standing 126/ 82 to 113/70 to 90/49.   TTE: wnl, Patient seen by PT deemed appropriate for home PT   Plan:   - continue to monitor functional status  - orthostatics improving,

## 2025-01-20 NOTE — DISCHARGE NOTE NURSING/CASE MANAGEMENT/SOCIAL WORK - PATIENT PORTAL LINK FT
You can access the FollowMyHealth Patient Portal offered by [facility] by registering at the following website: [email or web address]. By joining [email or web address]’s FollowMyHealth portal, you will also be able to view your health information using other applications (apps) compatible with our system.

## 2025-01-20 NOTE — DISCHARGE NOTE NURSING/CASE MANAGEMENT/SOCIAL WORK - FINANCIAL ASSISTANCE
[facility] provides services at a reduced cost to those who are determined to be eligible through [facility]’s financial assistance program. Information regarding [facility]’s financial assistance program can be found by going to [email or web address] or by calling [phone].

## 2025-01-21 VITALS — DIASTOLIC BLOOD PRESSURE: 93 MMHG | SYSTOLIC BLOOD PRESSURE: 165 MMHG

## 2025-01-21 LAB
CULTURE RESULTS: SIGNIFICANT CHANGE UP
CULTURE RESULTS: SIGNIFICANT CHANGE UP
SPECIMEN SOURCE: SIGNIFICANT CHANGE UP
SPECIMEN SOURCE: SIGNIFICANT CHANGE UP

## 2025-01-21 PROCEDURE — 86905 BLOOD TYPING RBC ANTIGENS: CPT

## 2025-01-21 PROCEDURE — 82330 ASSAY OF CALCIUM: CPT

## 2025-01-21 PROCEDURE — 84100 ASSAY OF PHOSPHORUS: CPT

## 2025-01-21 PROCEDURE — 70486 CT MAXILLOFACIAL W/O DYE: CPT | Mod: MC

## 2025-01-21 PROCEDURE — 85610 PROTHROMBIN TIME: CPT

## 2025-01-21 PROCEDURE — 82550 ASSAY OF CK (CPK): CPT

## 2025-01-21 PROCEDURE — 83735 ASSAY OF MAGNESIUM: CPT

## 2025-01-21 PROCEDURE — 0225U NFCT DS DNA&RNA 21 SARSCOV2: CPT

## 2025-01-21 PROCEDURE — 70450 CT HEAD/BRAIN W/O DYE: CPT | Mod: MC

## 2025-01-21 PROCEDURE — 83605 ASSAY OF LACTIC ACID: CPT

## 2025-01-21 PROCEDURE — 84484 ASSAY OF TROPONIN QUANT: CPT

## 2025-01-21 PROCEDURE — 86880 COOMBS TEST DIRECT: CPT

## 2025-01-21 PROCEDURE — 86901 BLOOD TYPING SEROLOGIC RH(D): CPT

## 2025-01-21 PROCEDURE — 87637 SARSCOV2&INF A&B&RSV AMP PRB: CPT

## 2025-01-21 PROCEDURE — 93005 ELECTROCARDIOGRAM TRACING: CPT

## 2025-01-21 PROCEDURE — 86140 C-REACTIVE PROTEIN: CPT

## 2025-01-21 PROCEDURE — 96374 THER/PROPH/DIAG INJ IV PUSH: CPT

## 2025-01-21 PROCEDURE — 97530 THERAPEUTIC ACTIVITIES: CPT

## 2025-01-21 PROCEDURE — 74177 CT ABD & PELVIS W/CONTRAST: CPT | Mod: MC

## 2025-01-21 PROCEDURE — 82803 BLOOD GASES ANY COMBINATION: CPT

## 2025-01-21 PROCEDURE — 86900 BLOOD TYPING SEROLOGIC ABO: CPT

## 2025-01-21 PROCEDURE — 83550 IRON BINDING TEST: CPT

## 2025-01-21 PROCEDURE — 83540 ASSAY OF IRON: CPT

## 2025-01-21 PROCEDURE — 90715 TDAP VACCINE 7 YRS/> IM: CPT

## 2025-01-21 PROCEDURE — 86870 RBC ANTIBODY IDENTIFICATION: CPT

## 2025-01-21 PROCEDURE — 84145 PROCALCITONIN (PCT): CPT

## 2025-01-21 PROCEDURE — 99285 EMERGENCY DEPT VISIT HI MDM: CPT | Mod: 25

## 2025-01-21 PROCEDURE — 36415 COLL VENOUS BLD VENIPUNCTURE: CPT

## 2025-01-21 PROCEDURE — 83880 ASSAY OF NATRIURETIC PEPTIDE: CPT

## 2025-01-21 PROCEDURE — 71250 CT THORAX DX C-: CPT | Mod: MC

## 2025-01-21 PROCEDURE — 85652 RBC SED RATE AUTOMATED: CPT

## 2025-01-21 PROCEDURE — 71045 X-RAY EXAM CHEST 1 VIEW: CPT

## 2025-01-21 PROCEDURE — 97162 PT EVAL MOD COMPLEX 30 MIN: CPT

## 2025-01-21 PROCEDURE — 97116 GAIT TRAINING THERAPY: CPT

## 2025-01-21 PROCEDURE — 81001 URINALYSIS AUTO W/SCOPE: CPT

## 2025-01-21 PROCEDURE — 84132 ASSAY OF SERUM POTASSIUM: CPT

## 2025-01-21 PROCEDURE — 87040 BLOOD CULTURE FOR BACTERIA: CPT

## 2025-01-21 PROCEDURE — 80053 COMPREHEN METABOLIC PANEL: CPT

## 2025-01-21 PROCEDURE — 84295 ASSAY OF SERUM SODIUM: CPT

## 2025-01-21 PROCEDURE — 82010 KETONE BODYS QUAN: CPT

## 2025-01-21 PROCEDURE — 84443 ASSAY THYROID STIM HORMONE: CPT

## 2025-01-21 PROCEDURE — 97165 OT EVAL LOW COMPLEX 30 MIN: CPT

## 2025-01-21 PROCEDURE — 93306 TTE W/DOPPLER COMPLETE: CPT

## 2025-01-21 PROCEDURE — 86850 RBC ANTIBODY SCREEN: CPT

## 2025-01-21 PROCEDURE — 82728 ASSAY OF FERRITIN: CPT

## 2025-01-21 PROCEDURE — 85025 COMPLETE CBC W/AUTO DIFF WBC: CPT

## 2025-01-21 PROCEDURE — 85730 THROMBOPLASTIN TIME PARTIAL: CPT

## 2025-01-21 RX ORDER — METOPROLOL SUCCINATE 25 MG
1 TABLET, EXTENDED RELEASE 24 HR ORAL
Qty: 30 | Refills: 0
Start: 2025-01-21 | End: 2025-02-19

## 2025-01-21 RX ADMIN — PREDNISONE 5 MILLIGRAM(S): 5 TABLET ORAL at 06:30

## 2025-01-21 RX ADMIN — Medication 25 MILLIGRAM(S): at 06:30

## 2025-01-21 RX ADMIN — LEVOTHYROXINE SODIUM 25 MICROGRAM(S): 25 TABLET ORAL at 06:30

## 2025-01-21 RX ADMIN — REMDESIVIR 200 MILLIGRAM(S): 100 INJECTION, POWDER, LYOPHILIZED, FOR SOLUTION INTRAVENOUS at 06:30

## 2025-01-21 NOTE — PROGRESS NOTE ADULT - PROBLEM SELECTOR PLAN 11
DVT ppx: on home AC  Diet: DASH diet  Replete: Mg<2, Ph<3, K<4  Dispo: RMF
DVT ppx: hold  Diet: DASH diet  Replete: Mg<2, Ph<3, K<4  Dispo: RMF
DVT ppx: on home AC  Diet: DASH diet  Replete: Mg<2, Ph<3, K<4  Dispo: RMF
DVT ppx: hold  Diet: DASH diet  Replete: Mg<2, Ph<3, K<4  Dispo: RMF

## 2025-01-21 NOTE — PROGRESS NOTE ADULT - PROBLEM/PLAN-2
DISPLAY PLAN FREE TEXT
fair, will monitor progress closely
DISPLAY PLAN FREE TEXT

## 2025-01-21 NOTE — PROGRESS NOTE ADULT - PROBLEM SELECTOR PLAN 4
Home med: Levothyroxine 25 qd  Plan :  -c/w levothyroxine  -tsh in am
Home med: Levothyroxine 25 qd  Plan :  -c/w levothyroxine 25mg qd
Home med: Levothyroxine 25 qd  Plan :  -c/w levothyroxine
Home med: Levothyroxine 25 qd  Plan :  -c/w levothyroxine

## 2025-01-21 NOTE — PROGRESS NOTE ADULT - PROBLEM SELECTOR PLAN 8
home med: prednisone 5 qd  Plan:   -c/w prednisone 5 qd

## 2025-01-21 NOTE — PROGRESS NOTE ADULT - ASSESSMENT
appreciate cardiology consult  on remdesevir  T max 99.5  PT  no longer hypotensive  likely dehydrated on admit and syncopized due to low BP  patient remembers now details of episode  likely dc for follow up with Dr Yoni Rodriguez and Dr Allen
if okay with Cardio plan on dc home tomorrow with home pT
  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   ·  Plan: Presents with unwitnessed fall with head strike, NO LOC. Able to recall walking to bathroom feeling weak and falling. Able to walk back to bed by herself. Found with stool incontinence  No prior falls, no hx of seizure  On physical exam without focal deficit  CT head, maxillofacial, CT chest without acute findings. A tiny hypodense focus peripherally in the spleen, uncertain if this represents a small cyst or a tiny laceration.  ECG: HR 97, QTc 515, RBBB, (not seen in ECG from 2022) no significant ST wave changes  DDX: Likely orthostatic given hypotension vs low concern for seizure (as patient able to recall events) vs cardiogenic (loop recorder currently not working and no recent TTE available) vs vasovagal  Noted to be orthostatic positive form lying to sitting to standing 126/ 82 to 113/70 to 90/49.   TTE: wnl  Plan:   - PT consult  - fall precautions  - Monitor Hgb and VS closely given spleen finding ?cannot exclude hematoma  - Consider EP consult in AM to replace loop recorder.    Problem/Plan - 2:  ·  Problem: Pneumonia due to COVID-19 virus.   ·  Plan: COVID + , Reports cough for few days, CXR with vascular congestion otherwise clear lungs    Plan:   - c/w remdesiver for 5 days 1/18 - 1/21  - ESR, CRP daily  - f/u blood culture.    Problem/Plan - 3:  ·  Problem: Elevated troponin.   ·  Plan: Trop 373->307  ECG: HR 97, QTc 515, RBBB, (not seen in ECG from 2022) no significant ST wave changes  TTE: complete 1/17  Plan:   - cards following (likely demand ischemia, no need for tele)  - Avoid QT-c prolonging meds  - Maintain Mg>2, Ph>3.    Problem/Plan - 4:  ·  Problem: Hypothyroid.   ·  Plan: Home med: Levothyroxine 25 qd  Plan :  -c/w levothyroxine.    Problem/Plan - 5:  ·  Problem: HTN (hypertension).   ·  Plan: home med: Valsartan 160qd and lisinopril 20 qd    Plan:   hold iso hypotension.    Problem/Plan - 6:  ·  Problem: Chronic atrial fibrillation.   ·  Plan: CHADSVASC ~ 6, patient high risk for stroke   Home med per HIE: Metoprolol  qd and xarelto 15 qd    Plan:   - hold metoprolol 12.5mg BID iso of orthostasis  - restarting Xarelto 15mg likely cyst on imaging, stable HGB, VS.    Problem/Plan - 7:  ·  Problem: Anemia.   ·  Plan: On admission, Hgb 10.1. No recent baseline hgb 2016 8.2->8.8  iron 16, TIBC 238, %sat 7  Plan:   -monitor hgb,if acute drop stat CT a/p  -transfuse for hgb<7.    Problem/Plan - 8:  ·  Problem: Polymyalgia rheumatica.   ·  Plan: home med: prednisone 5 qd  Plan:   -c/w prednisone 5 qd.    Problem/Plan - 9:  ·  Problem: Breast cancer.   ·  Plan: Tongue cancer  No hx of chemo, no active treatment     Plan:   -FREDERICK.    Problem/Plan - 10:  ·  Problem: Renal lesion.   ·  Plan; An indeterminate cystic lesion in the right kidney for which further characterization with ultrasound is advised on outpatient basis.    Plan:   -outpatient f/u.    Problem/Plan - 11:  ·  Problem: Prophylactic measure.   ·  Plan: DVT ppx: hold  Diet: DASH diet  Replete: Mg<2, Ph<3, K<4  Dispo: RMF.    
denies abdominal pain when i saw her  if not orthostatic can start getting out of bed  echo noted  PT eval noted recommend hector but patient wants to go home  await further recs by Cardio  remdesivir for Covid
syncope workup  cardio eval  CT head  
show
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 Cards following patient restarted on lower dose of toprol for management of afib titrating up to a dose of 50mg XL
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 Cards following patient restarted on lower dose of toprol for management of afib. 
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

## 2025-01-21 NOTE — PROGRESS NOTE ADULT - PROBLEM SELECTOR PLAN 7
On admission, Hgb 10.1. No recent baseline hgb 2016 8.2->8.8  iron 16, TIBC 238, %sat 7  Plan:   -monitor hgb,if acute drop stat CT a/p  -transfuse for hgb<7
On admission, Hgb 10.1. No recent baseline hgb 2016 8.2->8.8    Plan:   -iron studies in AM  -monitor hgb,if acute drop stat CT a/p  -transfuse for hgb<7
On admission, Hgb 10.1. No recent baseline hgb 2016 8.2->8.8  iron 16, TIBC 238, %sat 7  Plan:   -monitor hgb,if acute drop stat CT a/p  -transfuse for hgb<7
On admission, Hgb 10.1. No recent baseline hgb 2016 8.2->8.8  iron 16, TIBC 238, %sat 7  Plan:   -monitor hgb,if acute drop stat CT a/p  -transfuse for hgb<7

## 2025-01-21 NOTE — PROGRESS NOTE ADULT - PROBLEM SELECTOR PLAN 1
Presents with unwitnessed fall with head strike, NO LOC. Able to recall walking to bathroom feeling weak and falling. Able to walk back to bed by herself. Found with stool incontinence  No prior falls, no hx of seizure  On physical exam without focal deficit  CT head, maxillofacial, CT chest without acute findings. A tiny hypodense focus peripherally in the spleen, uncertain if this represents a small cyst or a tiny laceration.  ECG: HR 97, QTc 515, RBBB, (not seen in ECG from 2022) no significant ST wave changes  DDX: Likely orthostatic given hypotension vs low concern for seizure (as patient able to recall events) vs cardiogenic (loop recorder currently not working and no recent TTE available) vs vasovagal  Noted to be orthostatic positive form lying to sitting to standing 126/ 82 to 113/70 to 90/49.   TTE: wnl, Patient seen by PT deemed appropriate for home PT   Plan:   - continue to monitor functional status  - orthostatics improving

## 2025-01-21 NOTE — PROGRESS NOTE ADULT - PROBLEM SELECTOR PLAN 9
Tongue cancer  No hx of chemo, no active treatment     Plan:   -FREDERICK

## 2025-01-21 NOTE — PROGRESS NOTE ADULT - PROBLEM SELECTOR PLAN 5
home med: Valsartan 160qd and lisinopril 20 qd    Plan:   hold iso hypotension

## 2025-01-21 NOTE — PROGRESS NOTE ADULT - PROBLEM SELECTOR PLAN 10
An indeterminate cystic lesion in the right kidney for which further characterization with ultrasound is advised on outpatient basis.    Plan:   -outpatient f/u

## 2025-01-21 NOTE — PROGRESS NOTE ADULT - PROBLEM SELECTOR PLAN 6
CHADSVASC ~ 6, patient high risk for stroke   Home med per HIE: Metoprolol  qd and xarelto 15 qd    Plan:   - metoprolol 25mg BID on 1/21  - on Xarelto 15mg

## 2025-01-21 NOTE — PROGRESS NOTE ADULT - PROBLEM SELECTOR PLAN 3
Trop 373->307  ECG: HR 97, QTc 515, RBBB, (not seen in ECG from 2022) no significant ST wave changes  TTE: complete 1/17  Plan:   - cards following recommended to obtain collateral on management  - Avoid QT-c prolonging meds  - Maintain Mg>2, Ph>3
Trop 373->307  ECG: HR 97, QTc 515, RBBB, (not seen in ECG from 2022) no significant ST wave changes  TTE: complete 1/17  Plan:   - cards following (likely demand ischemia, no need for tele)  - Avoid QT-c prolonging meds  - Maintain Mg>2, Ph>3
Trop 373->307  ECG: HR 97, QTc 515, RBBB, (not seen in ECG from 2022) no significant ST wave changes    Plan:   - f/u cards recs (likely demand ischemia, no need for tele)  - pending ECHO  - Avoid QT-c prolonging meds  - Maintain Mg>2, Ph>3
Trop 373->307  ECG: HR 97, QTc 515, RBBB, (not seen in ECG from 2022) no significant ST wave changes  TTE: complete 1/17  Plan:   - cards following recommended to obtain collateral on management  - Avoid QT-c prolonging meds  - Maintain Mg>2, Ph>3

## 2025-01-21 NOTE — PROGRESS NOTE ADULT - PROVIDER SPECIALTY LIST ADULT
Internal Medicine
Rehab Medicine
Internal Medicine